# Patient Record
Sex: FEMALE | Race: WHITE | NOT HISPANIC OR LATINO | Employment: STUDENT | URBAN - METROPOLITAN AREA
[De-identification: names, ages, dates, MRNs, and addresses within clinical notes are randomized per-mention and may not be internally consistent; named-entity substitution may affect disease eponyms.]

---

## 2022-01-18 PROBLEM — F33.9 DEPRESSION, RECURRENT: Status: ACTIVE | Noted: 2022-01-18

## 2022-09-16 ENCOUNTER — OFFICE VISIT (OUTPATIENT)
Dept: FAMILY MEDICINE | Facility: CLINIC | Age: 22
End: 2022-09-16
Payer: COMMERCIAL

## 2022-09-16 VITALS
WEIGHT: 215.25 LBS | OXYGEN SATURATION: 99 % | DIASTOLIC BLOOD PRESSURE: 76 MMHG | RESPIRATION RATE: 18 BRPM | BODY MASS INDEX: 31.88 KG/M2 | HEART RATE: 88 BPM | HEIGHT: 69 IN | SYSTOLIC BLOOD PRESSURE: 110 MMHG

## 2022-09-16 DIAGNOSIS — F41.9 ANXIETY: ICD-10-CM

## 2022-09-16 DIAGNOSIS — R41.840 ATTENTION DEFICIT: Primary | ICD-10-CM

## 2022-09-16 DIAGNOSIS — F98.8 ATTENTION DEFICIT DISORDER, UNSPECIFIED HYPERACTIVITY PRESENCE: ICD-10-CM

## 2022-09-16 DIAGNOSIS — E66.9 OBESITY, UNSPECIFIED CLASSIFICATION, UNSPECIFIED OBESITY TYPE, UNSPECIFIED WHETHER SERIOUS COMORBIDITY PRESENT: ICD-10-CM

## 2022-09-16 PROCEDURE — 1160F PR REVIEW ALL MEDS BY PRESCRIBER/CLIN PHARMACIST DOCUMENTED: ICD-10-PCS | Mod: CPTII,S$GLB,, | Performed by: NURSE PRACTITIONER

## 2022-09-16 PROCEDURE — 3074F PR MOST RECENT SYSTOLIC BLOOD PRESSURE < 130 MM HG: ICD-10-PCS | Mod: CPTII,S$GLB,, | Performed by: NURSE PRACTITIONER

## 2022-09-16 PROCEDURE — 3078F PR MOST RECENT DIASTOLIC BLOOD PRESSURE < 80 MM HG: ICD-10-PCS | Mod: CPTII,S$GLB,, | Performed by: NURSE PRACTITIONER

## 2022-09-16 PROCEDURE — 3078F DIAST BP <80 MM HG: CPT | Mod: CPTII,S$GLB,, | Performed by: NURSE PRACTITIONER

## 2022-09-16 PROCEDURE — 3008F PR BODY MASS INDEX (BMI) DOCUMENTED: ICD-10-PCS | Mod: CPTII,S$GLB,, | Performed by: NURSE PRACTITIONER

## 2022-09-16 PROCEDURE — 3008F BODY MASS INDEX DOCD: CPT | Mod: CPTII,S$GLB,, | Performed by: NURSE PRACTITIONER

## 2022-09-16 PROCEDURE — 1159F PR MEDICATION LIST DOCUMENTED IN MEDICAL RECORD: ICD-10-PCS | Mod: CPTII,S$GLB,, | Performed by: NURSE PRACTITIONER

## 2022-09-16 PROCEDURE — 1159F MED LIST DOCD IN RCRD: CPT | Mod: CPTII,S$GLB,, | Performed by: NURSE PRACTITIONER

## 2022-09-16 PROCEDURE — 99204 PR OFFICE/OUTPT VISIT, NEW, LEVL IV, 45-59 MIN: ICD-10-PCS | Mod: S$GLB,,, | Performed by: NURSE PRACTITIONER

## 2022-09-16 PROCEDURE — 99204 OFFICE O/P NEW MOD 45 MIN: CPT | Mod: S$GLB,,, | Performed by: NURSE PRACTITIONER

## 2022-09-16 PROCEDURE — 3074F SYST BP LT 130 MM HG: CPT | Mod: CPTII,S$GLB,, | Performed by: NURSE PRACTITIONER

## 2022-09-16 PROCEDURE — 1160F RVW MEDS BY RX/DR IN RCRD: CPT | Mod: CPTII,S$GLB,, | Performed by: NURSE PRACTITIONER

## 2022-09-16 RX ORDER — PROPRANOLOL HYDROCHLORIDE 10 MG/1
10 TABLET ORAL 3 TIMES DAILY PRN
Qty: 90 TABLET | Refills: 11 | Status: SHIPPED | OUTPATIENT
Start: 2022-09-16 | End: 2023-09-16

## 2022-09-16 RX ORDER — SEMAGLUTIDE 1.34 MG/ML
0.5 INJECTION, SOLUTION SUBCUTANEOUS
Qty: 3 PEN | Refills: 3 | Status: SHIPPED | OUTPATIENT
Start: 2022-09-16 | End: 2023-01-12

## 2022-09-16 RX ORDER — LISDEXAMFETAMINE DIMESYLATE 40 MG/1
40 CAPSULE ORAL EVERY MORNING
Qty: 30 CAPSULE | Refills: 0 | Status: SHIPPED | OUTPATIENT
Start: 2022-11-16 | End: 2023-01-12 | Stop reason: SDUPTHER

## 2022-09-16 RX ORDER — LISDEXAMFETAMINE DIMESYLATE 40 MG/1
40 CAPSULE ORAL EVERY MORNING
Qty: 30 CAPSULE | Refills: 0 | Status: SHIPPED | OUTPATIENT
Start: 2022-10-16 | End: 2022-11-15

## 2022-09-16 RX ORDER — LISDEXAMFETAMINE DIMESYLATE 40 MG/1
40 CAPSULE ORAL EVERY MORNING
Qty: 30 CAPSULE | Refills: 0 | Status: SHIPPED | OUTPATIENT
Start: 2022-09-16 | End: 2022-10-16

## 2022-09-16 NOTE — PROGRESS NOTES
"Subjective:       Patient ID: Harmony Piedra is a 22 y.o. female.    Chief Complaint: Weight Gain  The pt presents today to discuss weight gain.     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  Frustrated, trying to watch what she eats, not exercising regularly but stays busy.    Pt has long hx of ADHD, stable on vyvanse 40 mg daily. Grades are well. Focus much better on medication.       HPI  Review of Systems   Constitutional:  Negative for appetite change, chills and fever.   HENT:  Negative for ear pain and postnasal drip.    Eyes:  Negative for pain and itching.   Respiratory:  Negative for chest tightness and shortness of breath.    Gastrointestinal:  Negative for abdominal distention and abdominal pain.   Endocrine: Negative for polydipsia and polyuria.   Genitourinary:  Negative for difficulty urinating and flank pain.   Skin:  Negative for color change and pallor.   Neurological:  Negative for light-headedness and headaches.   Hematological:  Negative for adenopathy. Does not bruise/bleed easily.   Psychiatric/Behavioral:  Negative for agitation.      Past medical, surgical, family and social history reviewed.  Objective:     Vitals:    09/16/22 0944   BP: 110/76   Pulse: 88   Resp: 18   SpO2: 99%   Weight: 97.7 kg (215 lb 4.5 oz)   Height: 5' 9" (1.753 m)   PainSc: 0-No pain     Body mass index is 31.79 kg/m².     Physical Exam  Constitutional:       General: She is not in acute distress.     Appearance: She is well-developed. She is obese. She is not diaphoretic.   HENT:      Head: Normocephalic and atraumatic.      Right Ear: Hearing, tympanic membrane, ear canal and external ear normal.      Left Ear: Hearing, tympanic membrane, ear canal and external ear normal.      Nose: Nose normal.      Mouth/Throat:      Pharynx: Uvula midline.   Eyes:      General:         Right eye: No discharge.         Left eye: No " discharge.      Conjunctiva/sclera: Conjunctivae normal.      Pupils: Pupils are equal, round, and reactive to light.   Neck:      Thyroid: No thyromegaly.      Vascular: No carotid bruit or JVD.      Trachea: Trachea normal.   Cardiovascular:      Rate and Rhythm: Normal rate and regular rhythm.      Heart sounds: No murmur heard.    No friction rub. No gallop.   Pulmonary:      Effort: Pulmonary effort is normal. No respiratory distress.      Breath sounds: Normal breath sounds. No wheezing or rales.   Chest:      Chest wall: No tenderness.   Abdominal:      General: Bowel sounds are normal. There is no distension.      Palpations: Abdomen is soft. There is no mass.      Tenderness: There is no abdominal tenderness. There is no guarding or rebound.   Musculoskeletal:         General: Normal range of motion.      Cervical back: Normal range of motion and neck supple.   Skin:     General: Skin is warm and dry.   Neurological:      Mental Status: She is alert and oriented to person, place, and time.      Coordination: Coordination normal.   Psychiatric:         Behavior: Behavior normal.         Thought Content: Thought content normal.         Judgment: Judgment normal.       Assessment:       1. Attention deficit    2. Attention deficit disorder, unspecified hyperactivity presence    3. Obesity, unspecified classification, unspecified obesity type, unspecified whether serious comorbidity present    4. Anxiety        Plan:       Harmony was seen today for weight gain.    Diagnoses and all orders for this visit:    Attention deficit    Attention deficit disorder, unspecified hyperactivity presence  -     lisdexamfetamine (VYVANSE) 40 MG Cap; Take 1 capsule (40 mg total) by mouth every morning.  -     lisdexamfetamine (VYVANSE) 40 MG Cap; Take 1 capsule (40 mg total) by mouth every morning.  -     lisdexamfetamine (VYVANSE) 40 MG Cap; Take 1 capsule (40 mg total) by mouth every morning.    Obesity, unspecified  classification, unspecified obesity type, unspecified whether serious comorbidity present    Anxiety  -     propranoloL (INDERAL) 10 MG tablet; Take 1 tablet (10 mg total) by mouth 3 (three) times daily as needed (anxiety).    Other orders  -     semaglutide (OZEMPIC) 0.25 mg or 0.5 mg(2 mg/1.5 mL) pen injector; Inject 0.5 mg into the skin every 7 days.    I spent 45 minutes on this encounter, time includes face-to-face, chart review, documentation, test review and orders.

## 2022-09-25 PROBLEM — F41.9 ANXIETY: Status: ACTIVE | Noted: 2022-09-25

## 2022-09-25 PROBLEM — F98.8 ATTENTION DEFICIT DISORDER: Status: ACTIVE | Noted: 2022-09-25

## 2022-09-25 PROBLEM — E66.9 OBESITY: Status: ACTIVE | Noted: 2022-09-25

## 2022-10-23 RX ORDER — SULFAMETHOXAZOLE AND TRIMETHOPRIM 800; 160 MG/1; MG/1
1 TABLET ORAL 2 TIMES DAILY
Qty: 6 TABLET | Refills: 0 | Status: SHIPPED | OUTPATIENT
Start: 2022-10-23 | End: 2022-10-26

## 2022-11-04 ENCOUNTER — PATIENT MESSAGE (OUTPATIENT)
Dept: FAMILY MEDICINE | Facility: CLINIC | Age: 22
End: 2022-11-04
Payer: COMMERCIAL

## 2022-11-04 DIAGNOSIS — N39.0 URINARY TRACT INFECTION WITHOUT HEMATURIA, SITE UNSPECIFIED: Primary | ICD-10-CM

## 2022-11-07 ENCOUNTER — LAB VISIT (OUTPATIENT)
Dept: LAB | Facility: HOSPITAL | Age: 22
End: 2022-11-07
Attending: NURSE PRACTITIONER
Payer: COMMERCIAL

## 2022-11-07 ENCOUNTER — PATIENT MESSAGE (OUTPATIENT)
Dept: FAMILY MEDICINE | Facility: CLINIC | Age: 22
End: 2022-11-07
Payer: COMMERCIAL

## 2022-11-07 DIAGNOSIS — N39.0 URINARY TRACT INFECTION WITHOUT HEMATURIA, SITE UNSPECIFIED: ICD-10-CM

## 2022-11-07 PROCEDURE — 87088 URINE BACTERIA CULTURE: CPT | Performed by: NURSE PRACTITIONER

## 2022-11-07 PROCEDURE — 87086 URINE CULTURE/COLONY COUNT: CPT | Performed by: NURSE PRACTITIONER

## 2022-11-07 PROCEDURE — 87186 SC STD MICRODIL/AGAR DIL: CPT | Performed by: NURSE PRACTITIONER

## 2022-11-07 PROCEDURE — 87077 CULTURE AEROBIC IDENTIFY: CPT | Performed by: NURSE PRACTITIONER

## 2022-11-09 ENCOUNTER — PATIENT MESSAGE (OUTPATIENT)
Dept: FAMILY MEDICINE | Facility: CLINIC | Age: 22
End: 2022-11-09
Payer: COMMERCIAL

## 2022-11-09 DIAGNOSIS — N39.0 COMPLICATED UTI (URINARY TRACT INFECTION): Primary | ICD-10-CM

## 2022-11-09 RX ORDER — CIPROFLOXACIN 500 MG/1
500 TABLET ORAL EVERY 12 HOURS
Qty: 14 TABLET | Refills: 0 | Status: SHIPPED | OUTPATIENT
Start: 2022-11-09 | End: 2022-11-14

## 2022-11-14 ENCOUNTER — TELEPHONE (OUTPATIENT)
Dept: FAMILY MEDICINE | Facility: CLINIC | Age: 22
End: 2022-11-14
Payer: COMMERCIAL

## 2022-11-14 ENCOUNTER — PATIENT MESSAGE (OUTPATIENT)
Dept: FAMILY MEDICINE | Facility: CLINIC | Age: 22
End: 2022-11-14
Payer: COMMERCIAL

## 2022-11-14 DIAGNOSIS — N39.0 URINARY TRACT INFECTION WITHOUT HEMATURIA, SITE UNSPECIFIED: Primary | ICD-10-CM

## 2022-11-14 RX ORDER — SULFAMETHOXAZOLE AND TRIMETHOPRIM 800; 160 MG/1; MG/1
1 TABLET ORAL 2 TIMES DAILY
Qty: 20 TABLET | Refills: 0 | Status: SHIPPED | OUTPATIENT
Start: 2022-11-14 | End: 2022-11-24

## 2022-11-16 DIAGNOSIS — F98.8 ATTENTION DEFICIT DISORDER, UNSPECIFIED HYPERACTIVITY PRESENCE: ICD-10-CM

## 2022-11-16 LAB — BACTERIA UR CULT: ABNORMAL

## 2022-11-16 RX ORDER — LISDEXAMFETAMINE DIMESYLATE 40 MG/1
40 CAPSULE ORAL EVERY MORNING
Qty: 30 CAPSULE | Refills: 0 | Status: CANCELLED | OUTPATIENT
Start: 2022-11-16 | End: 2022-12-16

## 2022-11-21 ENCOUNTER — OFFICE VISIT (OUTPATIENT)
Dept: UROLOGY | Facility: CLINIC | Age: 22
End: 2022-11-21
Payer: COMMERCIAL

## 2022-11-21 VITALS — WEIGHT: 200.63 LBS | HEIGHT: 66 IN | BODY MASS INDEX: 32.24 KG/M2

## 2022-11-21 DIAGNOSIS — N39.0 URINARY TRACT INFECTION WITHOUT HEMATURIA, SITE UNSPECIFIED: ICD-10-CM

## 2022-11-21 DIAGNOSIS — N30.00 ACUTE CYSTITIS WITHOUT HEMATURIA: Primary | ICD-10-CM

## 2022-11-21 PROCEDURE — 1159F PR MEDICATION LIST DOCUMENTED IN MEDICAL RECORD: ICD-10-PCS | Mod: CPTII,S$GLB,,

## 2022-11-21 PROCEDURE — 1159F MED LIST DOCD IN RCRD: CPT | Mod: CPTII,S$GLB,,

## 2022-11-21 PROCEDURE — 99999 PR PBB SHADOW E&M-EST. PATIENT-LVL III: ICD-10-PCS | Mod: PBBFAC,,,

## 2022-11-21 PROCEDURE — 3008F BODY MASS INDEX DOCD: CPT | Mod: CPTII,S$GLB,,

## 2022-11-21 PROCEDURE — 1160F RVW MEDS BY RX/DR IN RCRD: CPT | Mod: CPTII,S$GLB,,

## 2022-11-21 PROCEDURE — 99203 OFFICE O/P NEW LOW 30 MIN: CPT | Mod: S$GLB,,,

## 2022-11-21 PROCEDURE — 1160F PR REVIEW ALL MEDS BY PRESCRIBER/CLIN PHARMACIST DOCUMENTED: ICD-10-PCS | Mod: CPTII,S$GLB,,

## 2022-11-21 PROCEDURE — 99999 PR PBB SHADOW E&M-EST. PATIENT-LVL III: CPT | Mod: PBBFAC,,,

## 2022-11-21 PROCEDURE — 3008F PR BODY MASS INDEX (BMI) DOCUMENTED: ICD-10-PCS | Mod: CPTII,S$GLB,,

## 2022-11-21 PROCEDURE — 99203 PR OFFICE/OUTPT VISIT, NEW, LEVL III, 30-44 MIN: ICD-10-PCS | Mod: S$GLB,,,

## 2022-11-21 RX ORDER — METHENAMINE, SODIUM PHOSPHATE, MONOBASIC, MONOHYDRATE, PHENYL SALICYLATE, METHYLENE BLUE, AND HYOSCYAMINE SULFATE 118; 40.8; 36; 10; .12 MG/1; MG/1; MG/1; MG/1; MG/1
1 CAPSULE ORAL 4 TIMES DAILY PRN
Qty: 28 CAPSULE | Refills: 1 | Status: SHIPPED | OUTPATIENT
Start: 2022-11-21 | End: 2022-11-28

## 2022-11-21 NOTE — PROGRESS NOTES
[facility] Note  Staff: Eusebia Rosenberg FNP-C    PCP: REMEDIOS Guzman    Chief Complaint: UTI    Subjective:        HPI: Harmony Piedra is a 22 y.o. female NEW PATIENT presents today for evaluation of UTI. She states over the past few months she has taken multiple antibiotics for UTIs. She states her symptoms never truly resolve. She states in the past when she has a UTI, she takes antibiotics and all symptoms resolve without complications. She states she feels pain and pressure at her urethra and urgency. She is currently taking bactrim DS. Her only recent urine culture in Epic is from 11/7/22 positive for acinetobacter baumannii/haemolyticus 10,000-49,999. She denies fever, flank pain, hematuria, incontinence, back pain, and feeling of incomplete bladder emptying. She denies a history of kidney stones. She is currently not taking any bladder medications.     Questions asked pt during office visit today:  Urgency:Yes , incontinence with urgency? No;   DysuriaYes   Gross HematuriaNo  History of UTI: Yes     History of Kidney Stones?:  No    Constipation issues?:  No    REVIEW OF SYSTEMS:  Review of Systems   Constitutional: Negative.  Negative for chills and fever.   HENT: Negative.     Eyes: Negative.    Respiratory: Negative.     Cardiovascular: Negative.    Gastrointestinal: Negative.  Negative for abdominal pain, nausea and vomiting.   Genitourinary:  Positive for dysuria and urgency. Negative for flank pain, frequency and hematuria.   Musculoskeletal: Negative.  Negative for back pain.   Skin: Negative.    Neurological: Negative.    Endo/Heme/Allergies: Negative.    Psychiatric/Behavioral: Negative.       PMHx:  Past Medical History:   Diagnosis Date    Anxiety        PSHx:  Past Surgical History:   Procedure Laterality Date    TONSILLECTOMY         Fam Hx:   malignancies: No , gyn malignancies: No   kidney stones: No     Soc Hx:  Lives in [place]    Allergies:  Corticosteroids  (glucocorticoids), Dexamethasone, and Hydrocortisone    Medications: reviewed     Objective:   There were no vitals filed for this visit.    Physical Exam  Constitutional:       Appearance: Normal appearance.   HENT:      Head: Normocephalic.      Mouth/Throat:      Mouth: Mucous membranes are moist.   Eyes:      Conjunctiva/sclera: Conjunctivae normal.   Pulmonary:      Effort: Pulmonary effort is normal.   Abdominal:      General: There is no distension.      Palpations: Abdomen is soft.      Tenderness: There is no abdominal tenderness. There is no right CVA tenderness or left CVA tenderness.   Musculoskeletal:         General: Normal range of motion.      Cervical back: Normal range of motion.   Skin:     General: Skin is warm.   Neurological:      Mental Status: She is alert and oriented to person, place, and time.   Psychiatric:         Mood and Affect: Mood normal.         Behavior: Behavior normal.         LABS REVIEW:  UA today:   Color:Clear, Yellow  Spec. Grav.  1.020  PH  6.0  Negative for leukocytes, nitrates, protein, glucose, ketones, urobili, bili, and blood.    Assessment:       1. Acute cystitis without hematuria    2. Urinary tract infection without hematuria, site unspecified            Plan:      CT Urogram ordered and scheduled  uribel prescribed to pt today as trial to see if med improves pt's current LUTS.  Benefits, risks and side affects were thoroughly explained to pt today in office with all questions answered.  Urine culture ordered to be done 5 days after finishing antibiotics at [facility]  For your recurrent UTIs:  Behavioral changes to help decrease UTI recurrences   -increase water intake (6 to 8 bottles water per day)   -don't hold urine, void every 2 to 3 hours   -prevent constipation (miralax capful daily if necessary) to have a bowel movement daily and keep stools soft  -cut down on caffeine,drink mainly water  -no douching   -void immediately after sexual intercourse  -wipe  front to back     OTC meds to try (go to the pharmacist and ask where they are, they are over the counter)  -cranberry pills 500mg tabs TID, can buy over the counter  -take a probiotic daily designed for vaginal and urinary health  -D-Mannose once daily over the counter    IMPORTANT: If you feel like you have a UTI coming on, call our office and talk to one of the nurses. We will have you drop off a urine here in clinic or at one of our [facility] facilities. Avoid going to urgent care. We need to start documenting your urine cultures here in our office to see if they are really recurrent UTIs or sx of UTIs.     If you have fever and it doesn't improve with tylenol or ibuprofen or if you have flank pain associated with the uti symptoms go to the ER.     If you do continue to have positive urine cultures then we may proceed with doing a cystoscopy (to look in your bladder)    F/u As Needed    [facility]: Active    Eusebia Rosenberg, GUS

## 2022-11-22 ENCOUNTER — PATIENT MESSAGE (OUTPATIENT)
Dept: UROLOGY | Facility: CLINIC | Age: 22
End: 2022-11-22
Payer: COMMERCIAL

## 2022-11-23 ENCOUNTER — TELEPHONE (OUTPATIENT)
Dept: UROLOGY | Facility: CLINIC | Age: 22
End: 2022-11-23
Payer: COMMERCIAL

## 2022-11-29 ENCOUNTER — HOSPITAL ENCOUNTER (OUTPATIENT)
Dept: RADIOLOGY | Facility: HOSPITAL | Age: 22
Discharge: HOME | End: 2022-11-29
Payer: COMMERCIAL

## 2022-11-29 DIAGNOSIS — N30.00 ACUTE CYSTITIS WITHOUT HEMATURIA: ICD-10-CM

## 2022-11-29 PROCEDURE — 25500020 PHARM REV CODE 255: Mod: PO

## 2022-11-29 PROCEDURE — 74178 CT ABD&PLV WO CNTR FLWD CNTR: CPT | Mod: 26,,, | Performed by: RADIOLOGY

## 2022-11-29 PROCEDURE — 74178 CT ABD&PLV WO CNTR FLWD CNTR: CPT | Mod: TC,PO

## 2022-11-29 PROCEDURE — 74178 CT UROGRAM ABD PELVIS W WO: ICD-10-PCS | Mod: 26,,, | Performed by: RADIOLOGY

## 2022-11-29 RX ADMIN — IOHEXOL 125 ML: 350 INJECTION, SOLUTION INTRAVENOUS at 05:11

## 2022-11-30 ENCOUNTER — PATIENT MESSAGE (OUTPATIENT)
Dept: FAMILY MEDICINE | Facility: CLINIC | Age: 22
End: 2022-11-30
Payer: COMMERCIAL

## 2022-11-30 DIAGNOSIS — K76.9 LIVER DISEASE, UNSPECIFIED: Primary | ICD-10-CM

## 2022-12-01 ENCOUNTER — PATIENT MESSAGE (OUTPATIENT)
Dept: UROLOGY | Facility: CLINIC | Age: 22
End: 2022-12-01
Payer: COMMERCIAL

## 2022-12-11 ENCOUNTER — PATIENT MESSAGE (OUTPATIENT)
Dept: FAMILY MEDICINE | Facility: CLINIC | Age: 22
End: 2022-12-11
Payer: COMMERCIAL

## 2022-12-16 ENCOUNTER — PATIENT MESSAGE (OUTPATIENT)
Dept: UROLOGY | Facility: CLINIC | Age: 22
End: 2022-12-16
Payer: COMMERCIAL

## 2022-12-20 ENCOUNTER — CLINICAL SUPPORT (OUTPATIENT)
Dept: UROLOGY | Facility: CLINIC | Age: 22
End: 2022-12-20
Payer: COMMERCIAL

## 2022-12-20 DIAGNOSIS — N39.0 URINARY TRACT INFECTION WITHOUT HEMATURIA, SITE UNSPECIFIED: Primary | ICD-10-CM

## 2022-12-20 DIAGNOSIS — R31.29 MICROSCOPIC HEMATURIA: ICD-10-CM

## 2022-12-20 PROCEDURE — 87086 URINE CULTURE/COLONY COUNT: CPT

## 2022-12-20 PROCEDURE — 51701 PR INSERTION OF NON-INDWELLING BLADDER CATHETERIZATION FOR RESIDUAL UR: ICD-10-PCS | Mod: S$GLB,,,

## 2022-12-20 PROCEDURE — 99999 PR PBB SHADOW E&M-EST. PATIENT-LVL I: ICD-10-PCS | Mod: PBBFAC,,,

## 2022-12-20 PROCEDURE — 51701 INSERT BLADDER CATHETER: CPT | Mod: S$GLB,,,

## 2022-12-20 PROCEDURE — 99999 PR PBB SHADOW E&M-EST. PATIENT-LVL I: CPT | Mod: PBBFAC,,,

## 2022-12-20 NOTE — PROGRESS NOTES
Patient to clinic with reports of urinary frequency , per provider ok to get catheterized urine sample. Patient catheterized using sterile technique, aprox 60 ml clear yellow urine drained from patient bladder, patient tolerated well.

## 2022-12-21 LAB — BACTERIA UR CULT: NO GROWTH

## 2022-12-22 ENCOUNTER — HOSPITAL ENCOUNTER (OUTPATIENT)
Dept: RADIOLOGY | Facility: HOSPITAL | Age: 22
Discharge: HOME | End: 2022-12-22
Attending: NURSE PRACTITIONER
Payer: COMMERCIAL

## 2022-12-22 DIAGNOSIS — K76.9 LIVER DISEASE, UNSPECIFIED: ICD-10-CM

## 2022-12-22 PROCEDURE — 25500020 PHARM REV CODE 255: Performed by: NURSE PRACTITIONER

## 2022-12-22 PROCEDURE — 74183 MRI ABDOMEN W WO CONTRAST: ICD-10-PCS | Mod: 26,,, | Performed by: RADIOLOGY

## 2022-12-22 PROCEDURE — 74183 MRI ABD W/O CNTR FLWD CNTR: CPT | Mod: 26,,, | Performed by: RADIOLOGY

## 2022-12-22 PROCEDURE — A9585 GADOBUTROL INJECTION: HCPCS | Performed by: NURSE PRACTITIONER

## 2022-12-22 PROCEDURE — 74183 MRI ABD W/O CNTR FLWD CNTR: CPT | Mod: TC

## 2022-12-22 RX ORDER — GADOBUTROL 604.72 MG/ML
9 INJECTION INTRAVENOUS
Status: COMPLETED | OUTPATIENT
Start: 2022-12-22 | End: 2022-12-22

## 2022-12-22 RX ADMIN — GADOBUTROL 9 ML: 604.72 INJECTION INTRAVENOUS at 04:12

## 2022-12-23 ENCOUNTER — TELEPHONE (OUTPATIENT)
Dept: UROLOGY | Facility: CLINIC | Age: 22
End: 2022-12-23
Payer: COMMERCIAL

## 2022-12-23 NOTE — TELEPHONE ENCOUNTER
Lm on patient vm , patient recent urine culture showed no growth , poct urine did show trace blood, per patient provider will get patient scheduled for in office cystoscopy .

## 2023-01-12 ENCOUNTER — OFFICE VISIT (OUTPATIENT)
Dept: FAMILY MEDICINE | Facility: CLINIC | Age: 23
End: 2023-01-12
Payer: COMMERCIAL

## 2023-01-12 VITALS
WEIGHT: 210.56 LBS | SYSTOLIC BLOOD PRESSURE: 118 MMHG | OXYGEN SATURATION: 98 % | RESPIRATION RATE: 18 BRPM | HEART RATE: 77 BPM | BODY MASS INDEX: 33.84 KG/M2 | HEIGHT: 66 IN | DIASTOLIC BLOOD PRESSURE: 76 MMHG

## 2023-01-12 DIAGNOSIS — E66.9 OBESITY, UNSPECIFIED CLASSIFICATION, UNSPECIFIED OBESITY TYPE, UNSPECIFIED WHETHER SERIOUS COMORBIDITY PRESENT: ICD-10-CM

## 2023-01-12 DIAGNOSIS — F98.8 ATTENTION DEFICIT DISORDER, UNSPECIFIED HYPERACTIVITY PRESENCE: Primary | ICD-10-CM

## 2023-01-12 DIAGNOSIS — Q24.8 PERICARDIAL CYST: ICD-10-CM

## 2023-01-12 DIAGNOSIS — F33.9 DEPRESSION, RECURRENT: ICD-10-CM

## 2023-01-12 PROCEDURE — 99214 PR OFFICE/OUTPT VISIT, EST, LEVL IV, 30-39 MIN: ICD-10-PCS | Mod: S$GLB,,, | Performed by: NURSE PRACTITIONER

## 2023-01-12 PROCEDURE — 1159F MED LIST DOCD IN RCRD: CPT | Mod: CPTII,S$GLB,, | Performed by: NURSE PRACTITIONER

## 2023-01-12 PROCEDURE — 3074F PR MOST RECENT SYSTOLIC BLOOD PRESSURE < 130 MM HG: ICD-10-PCS | Mod: CPTII,S$GLB,, | Performed by: NURSE PRACTITIONER

## 2023-01-12 PROCEDURE — 1159F PR MEDICATION LIST DOCUMENTED IN MEDICAL RECORD: ICD-10-PCS | Mod: CPTII,S$GLB,, | Performed by: NURSE PRACTITIONER

## 2023-01-12 PROCEDURE — 3078F PR MOST RECENT DIASTOLIC BLOOD PRESSURE < 80 MM HG: ICD-10-PCS | Mod: CPTII,S$GLB,, | Performed by: NURSE PRACTITIONER

## 2023-01-12 PROCEDURE — 99214 OFFICE O/P EST MOD 30 MIN: CPT | Mod: S$GLB,,, | Performed by: NURSE PRACTITIONER

## 2023-01-12 PROCEDURE — 3074F SYST BP LT 130 MM HG: CPT | Mod: CPTII,S$GLB,, | Performed by: NURSE PRACTITIONER

## 2023-01-12 PROCEDURE — 1160F PR REVIEW ALL MEDS BY PRESCRIBER/CLIN PHARMACIST DOCUMENTED: ICD-10-PCS | Mod: CPTII,S$GLB,, | Performed by: NURSE PRACTITIONER

## 2023-01-12 PROCEDURE — 1160F RVW MEDS BY RX/DR IN RCRD: CPT | Mod: CPTII,S$GLB,, | Performed by: NURSE PRACTITIONER

## 2023-01-12 PROCEDURE — 3008F BODY MASS INDEX DOCD: CPT | Mod: CPTII,S$GLB,, | Performed by: NURSE PRACTITIONER

## 2023-01-12 PROCEDURE — 3078F DIAST BP <80 MM HG: CPT | Mod: CPTII,S$GLB,, | Performed by: NURSE PRACTITIONER

## 2023-01-12 PROCEDURE — 3008F PR BODY MASS INDEX (BMI) DOCUMENTED: ICD-10-PCS | Mod: CPTII,S$GLB,, | Performed by: NURSE PRACTITIONER

## 2023-01-12 RX ORDER — LISDEXAMFETAMINE DIMESYLATE 40 MG/1
40 CAPSULE ORAL EVERY MORNING
Qty: 30 CAPSULE | Refills: 0 | Status: SHIPPED | OUTPATIENT
Start: 2023-01-12 | End: 2023-02-09

## 2023-01-12 RX ORDER — LISDEXAMFETAMINE DIMESYLATE 40 MG/1
40 CAPSULE ORAL EVERY MORNING
Qty: 30 CAPSULE | Refills: 0 | Status: SHIPPED | OUTPATIENT
Start: 2023-02-12 | End: 2023-02-09

## 2023-01-12 RX ORDER — LISDEXAMFETAMINE DIMESYLATE 40 MG/1
40 CAPSULE ORAL EVERY MORNING
Qty: 30 CAPSULE | Refills: 0 | Status: SHIPPED | OUTPATIENT
Start: 2023-03-12 | End: 2023-03-26 | Stop reason: SDUPTHER

## 2023-01-12 RX ORDER — SEMAGLUTIDE 1.34 MG/ML
1 INJECTION, SOLUTION SUBCUTANEOUS
Qty: 3 PEN | Refills: 3 | Status: SHIPPED | OUTPATIENT
Start: 2023-01-12 | End: 2023-02-15

## 2023-01-12 NOTE — PROGRESS NOTES
"Subjective:       Patient ID: Harmony Piedra is a 22 y.o. female.    Chief Complaint: Follow-up  Alexei DIANAU   [employer]     Pt has long hx of ADHD, stable on vyvanse 40 mg daily. Grades are well. Focus much better on medication. would like refill on medication.     The patient has been on ozempic for weight loss.  She is currently on 0.5 mg.  She is only down approximately 5 lb since our last visit.  She is not experiencing any side effects to the medication.    Pericardial cyst incidentally found on imaging, pt is asymptomatic.      Follow-up  Pertinent negatives include no abdominal pain, arthralgias, congestion, headaches, myalgias, nausea, rash or vomiting.   Review of Systems   Constitutional:  Negative for activity change and appetite change.   HENT:  Negative for congestion, postnasal drip, rhinorrhea and sinus pressure.    Eyes:  Negative for pain and redness.   Respiratory:  Negative for choking and chest tightness.    Gastrointestinal:  Negative for abdominal distention, abdominal pain, blood in stool, constipation, diarrhea, nausea and vomiting.   Endocrine: Negative for polydipsia and polyphagia.   Genitourinary:  Negative for dysuria and hematuria.   Musculoskeletal:  Negative for arthralgias and myalgias.   Skin:  Negative for color change and rash.   Neurological:  Negative for dizziness and headaches.   Psychiatric/Behavioral:  Negative for agitation and behavioral problems.      Past medical, surgical, family and social history reviewed.  Objective:     Vitals:    01/12/23 1122   BP: 118/76   Pulse: 77   Resp: 18   SpO2: 98%   Weight: 95.5 kg (210 lb 8.6 oz)   Height: 5' 6" (1.676 m)   PainSc: 0-No pain     Body mass index is 33.98 kg/m².     Physical Exam  Constitutional:       Appearance: She is well-developed. She is obese.   HENT:      Head: Normocephalic and atraumatic.      Right Ear: External ear normal.      Left Ear: External ear normal.      Nose: Nose normal.   Eyes: "      General: No scleral icterus.        Right eye: No discharge.         Left eye: No discharge.      Conjunctiva/sclera: Conjunctivae normal.   Neck:      Trachea: No tracheal deviation.   Cardiovascular:      Rate and Rhythm: Normal rate and regular rhythm.      Heart sounds: Normal heart sounds. No murmur heard.    No friction rub.   Pulmonary:      Effort: Pulmonary effort is normal. No respiratory distress.      Breath sounds: Normal breath sounds. No stridor. No wheezing or rales.   Chest:      Chest wall: No tenderness.   Musculoskeletal:         General: Normal range of motion.      Cervical back: Normal range of motion and neck supple.   Lymphadenopathy:      Cervical: No cervical adenopathy.   Skin:     General: Skin is warm and dry.   Neurological:      Mental Status: She is alert and oriented to person, place, and time.       Assessment:       1. Attention deficit disorder, unspecified hyperactivity presence    2. Pericardial cyst    3. Depression, recurrent    4. Obesity, unspecified classification, unspecified obesity type, unspecified whether serious comorbidity present          Plan:       Harmony was seen today for follow-up.    Diagnoses and all orders for this visit:    Attention deficit disorder, unspecified hyperactivity presence  -     lisdexamfetamine (VYVANSE) 40 MG Cap; Take 1 capsule (40 mg total) by mouth every morning.  -     lisdexamfetamine (VYVANSE) 40 MG Cap; Take 1 capsule (40 mg total) by mouth every morning.  -     lisdexamfetamine (VYVANSE) 40 MG Cap; Take 1 capsule (40 mg total) by mouth every morning.    Pericardial cyst  -     Ambulatory referral/consult to Cardiology; Future    Depression, recurrent  Stable off meds    Obesity, unspecified classification, unspecified obesity type, unspecified whether serious comorbidity present    -     semaglutide (OZEMPIC) 1 mg/dose (4 mg/3 mL); Inject 1 mg into the skin every 7 days.    I spent 30 minutes on this encounter, time includes  face-to-face, chart review, documentation, test review and orders.

## 2023-01-28 ENCOUNTER — PATIENT MESSAGE (OUTPATIENT)
Dept: UROLOGY | Facility: CLINIC | Age: 23
End: 2023-01-28
Payer: COMMERCIAL

## 2023-01-28 ENCOUNTER — PATIENT MESSAGE (OUTPATIENT)
Dept: FAMILY MEDICINE | Facility: CLINIC | Age: 23
End: 2023-01-28
Payer: COMMERCIAL

## 2023-01-31 ENCOUNTER — CLINICAL SUPPORT (OUTPATIENT)
Dept: UROLOGY | Facility: CLINIC | Age: 23
End: 2023-01-31
Payer: COMMERCIAL

## 2023-01-31 DIAGNOSIS — N39.0 URINARY TRACT INFECTION WITHOUT HEMATURIA, SITE UNSPECIFIED: Primary | ICD-10-CM

## 2023-01-31 PROCEDURE — 51701 PR INSERTION OF NON-INDWELLING BLADDER CATHETERIZATION FOR RESIDUAL UR: ICD-10-PCS | Mod: S$GLB,,,

## 2023-01-31 PROCEDURE — 99999 PR PBB SHADOW E&M-EST. PATIENT-LVL I: ICD-10-PCS | Mod: PBBFAC,,,

## 2023-01-31 PROCEDURE — 51701 INSERT BLADDER CATHETER: CPT | Mod: S$GLB,,,

## 2023-01-31 PROCEDURE — 99999 PR PBB SHADOW E&M-EST. PATIENT-LVL I: CPT | Mod: PBBFAC,,,

## 2023-01-31 PROCEDURE — 87086 URINE CULTURE/COLONY COUNT: CPT

## 2023-01-31 NOTE — PROGRESS NOTES
Patient to clinic with c/o dysuria , patient catheterized using sterile technique , aprox 400 ml clear yellow urine drained from bladder, will send for culture. Patient tolerated well.

## 2023-02-02 LAB — BACTERIA UR CULT: NO GROWTH

## 2023-02-08 ENCOUNTER — PATIENT MESSAGE (OUTPATIENT)
Dept: FAMILY MEDICINE | Facility: CLINIC | Age: 23
End: 2023-02-08
Payer: COMMERCIAL

## 2023-02-09 ENCOUNTER — PATIENT MESSAGE (OUTPATIENT)
Dept: FAMILY MEDICINE | Facility: CLINIC | Age: 23
End: 2023-02-09
Payer: COMMERCIAL

## 2023-02-09 ENCOUNTER — OFFICE VISIT (OUTPATIENT)
Dept: CARDIOLOGY | Facility: CLINIC | Age: 23
End: 2023-02-09
Payer: COMMERCIAL

## 2023-02-09 VITALS
WEIGHT: 209 LBS | BODY MASS INDEX: 30.96 KG/M2 | HEIGHT: 69 IN | DIASTOLIC BLOOD PRESSURE: 81 MMHG | HEART RATE: 84 BPM | SYSTOLIC BLOOD PRESSURE: 122 MMHG

## 2023-02-09 DIAGNOSIS — Q24.8 PERICARDIAL CYST: ICD-10-CM

## 2023-02-09 PROCEDURE — 3079F PR MOST RECENT DIASTOLIC BLOOD PRESSURE 80-89 MM HG: ICD-10-PCS | Mod: CPTII,S$GLB,, | Performed by: INTERNAL MEDICINE

## 2023-02-09 PROCEDURE — 3008F BODY MASS INDEX DOCD: CPT | Mod: CPTII,S$GLB,, | Performed by: INTERNAL MEDICINE

## 2023-02-09 PROCEDURE — 1159F MED LIST DOCD IN RCRD: CPT | Mod: CPTII,S$GLB,, | Performed by: INTERNAL MEDICINE

## 2023-02-09 PROCEDURE — 3008F PR BODY MASS INDEX (BMI) DOCUMENTED: ICD-10-PCS | Mod: CPTII,S$GLB,, | Performed by: INTERNAL MEDICINE

## 2023-02-09 PROCEDURE — 99204 PR OFFICE/OUTPT VISIT, NEW, LEVL IV, 45-59 MIN: ICD-10-PCS | Mod: S$GLB,,, | Performed by: INTERNAL MEDICINE

## 2023-02-09 PROCEDURE — 3079F DIAST BP 80-89 MM HG: CPT | Mod: CPTII,S$GLB,, | Performed by: INTERNAL MEDICINE

## 2023-02-09 PROCEDURE — 93010 ELECTROCARDIOGRAM REPORT: CPT | Mod: S$GLB,,, | Performed by: INTERNAL MEDICINE

## 2023-02-09 PROCEDURE — 3074F SYST BP LT 130 MM HG: CPT | Mod: CPTII,S$GLB,, | Performed by: INTERNAL MEDICINE

## 2023-02-09 PROCEDURE — 3074F PR MOST RECENT SYSTOLIC BLOOD PRESSURE < 130 MM HG: ICD-10-PCS | Mod: CPTII,S$GLB,, | Performed by: INTERNAL MEDICINE

## 2023-02-09 PROCEDURE — 93005 ELECTROCARDIOGRAM TRACING: CPT | Mod: PO

## 2023-02-09 PROCEDURE — 99204 OFFICE O/P NEW MOD 45 MIN: CPT | Mod: S$GLB,,, | Performed by: INTERNAL MEDICINE

## 2023-02-09 PROCEDURE — 99999 PR PBB SHADOW E&M-EST. PATIENT-LVL IV: ICD-10-PCS | Mod: PBBFAC,,, | Performed by: INTERNAL MEDICINE

## 2023-02-09 PROCEDURE — 93010 EKG 12-LEAD: ICD-10-PCS | Mod: S$GLB,,, | Performed by: INTERNAL MEDICINE

## 2023-02-09 PROCEDURE — 1159F PR MEDICATION LIST DOCUMENTED IN MEDICAL RECORD: ICD-10-PCS | Mod: CPTII,S$GLB,, | Performed by: INTERNAL MEDICINE

## 2023-02-09 PROCEDURE — 99999 PR PBB SHADOW E&M-EST. PATIENT-LVL IV: CPT | Mod: PBBFAC,,, | Performed by: INTERNAL MEDICINE
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
## 2023-02-15 ENCOUNTER — PATIENT MESSAGE (OUTPATIENT)
Dept: FAMILY MEDICINE | Facility: CLINIC | Age: 23
End: 2023-02-15
Payer: COMMERCIAL

## 2023-02-15 RX ORDER — SEMAGLUTIDE 2.68 MG/ML
2 INJECTION, SOLUTION SUBCUTANEOUS
Qty: 3 PEN | Refills: 3 | Status: SHIPPED | OUTPATIENT
Start: 2023-02-15 | End: 2023-05-08 | Stop reason: SDUPTHER

## 2023-02-20 ENCOUNTER — HOSPITAL ENCOUNTER (OUTPATIENT)
Dept: RADIOLOGY | Facility: HOSPITAL | Age: 23
Discharge: HOME | End: 2023-02-20
Attending: INTERNAL MEDICINE
Payer: COMMERCIAL

## 2023-02-20 DIAGNOSIS — Q24.8 PERICARDIAL CYST: ICD-10-CM

## 2023-02-20 PROCEDURE — 71260 CT THORAX DX C+: CPT | Mod: 26,,, | Performed by: RADIOLOGY

## 2023-02-20 PROCEDURE — 25500020 PHARM REV CODE 255: Mod: PO | Performed by: INTERNAL MEDICINE

## 2023-02-20 PROCEDURE — 71260 CT THORAX DX C+: CPT | Mod: TC,PO

## 2023-02-20 PROCEDURE — 71260 CT CHEST WITH CONTRAST: ICD-10-PCS | Mod: 26,,, | Performed by: RADIOLOGY

## 2023-02-20 RX ADMIN — IOHEXOL 75 ML: 350 INJECTION, SOLUTION INTRAVENOUS at 04:02

## 2023-03-01 ENCOUNTER — CLINICAL SUPPORT (OUTPATIENT)
Dept: CARDIOLOGY | Facility: HOSPITAL | Age: 23
End: 2023-03-01
Attending: INTERNAL MEDICINE
Payer: COMMERCIAL

## 2023-03-01 VITALS
DIASTOLIC BLOOD PRESSURE: 80 MMHG | WEIGHT: 208 LBS | HEIGHT: 69 IN | SYSTOLIC BLOOD PRESSURE: 122 MMHG | BODY MASS INDEX: 30.81 KG/M2

## 2023-03-01 DIAGNOSIS — Q24.8 PERICARDIAL CYST: ICD-10-CM

## 2023-03-01 PROCEDURE — 93306 ECHO (CUPID ONLY): ICD-10-PCS | Mod: 26,,, | Performed by: INTERNAL MEDICINE

## 2023-03-01 PROCEDURE — 93306 TTE W/DOPPLER COMPLETE: CPT | Mod: 26,,, | Performed by: INTERNAL MEDICINE

## 2023-03-01 PROCEDURE — 93306 TTE W/DOPPLER COMPLETE: CPT | Mod: PO

## 2023-03-02 LAB
ASCENDING AORTA: 2.15 CM
AV INDEX (PROSTH): 0.77
AV MEAN GRADIENT: 4 MMHG
AV PEAK GRADIENT: 6 MMHG
AV VALVE AREA: 2.7 CM2
AV VELOCITY RATIO: 0.76
BSA FOR ECHO PROCEDURE: 2.14 M2
CV ECHO LV RWT: 0.35 CM
DOP CALC AO PEAK VEL: 1.23 M/S
DOP CALC AO VTI: 24.3 CM
DOP CALC LVOT AREA: 3.5 CM2
DOP CALC LVOT DIAMETER: 2.11 CM
DOP CALC LVOT PEAK VEL: 0.94 M/S
DOP CALC LVOT STROKE VOLUME: 65.7 CM3
DOP CALCLVOT PEAK VEL VTI: 18.8 CM
E WAVE DECELERATION TIME: 162.44 MSEC
E/A RATIO: 1.38
E/E' RATIO: 5.53 M/S
ECHO LV POSTERIOR WALL: 0.87 CM (ref 0.6–1.1)
EJECTION FRACTION: 55 %
FRACTIONAL SHORTENING: 33 % (ref 28–44)
INTERVENTRICULAR SEPTUM: 0.8 CM (ref 0.6–1.1)
IVRT: 79.92 MSEC
LA MAJOR: 5.11 CM
LA MINOR: 4.8 CM
LA WIDTH: 3.4 CM
LEFT ATRIUM SIZE: 3.81 CM
LEFT ATRIUM VOLUME INDEX: 26 ML/M2
LEFT ATRIUM VOLUME: 54.51 CM3
LEFT INTERNAL DIMENSION IN SYSTOLE: 3.31 CM (ref 2.1–4)
LEFT VENTRICLE DIASTOLIC VOLUME INDEX: 54.57 ML/M2
LEFT VENTRICLE DIASTOLIC VOLUME: 114.6 ML
LEFT VENTRICLE MASS INDEX: 67 G/M2
LEFT VENTRICLE SYSTOLIC VOLUME INDEX: 21.1 ML/M2
LEFT VENTRICLE SYSTOLIC VOLUME: 44.36 ML
LEFT VENTRICULAR INTERNAL DIMENSION IN DIASTOLE: 4.93 CM (ref 3.5–6)
LEFT VENTRICULAR MASS: 140.1 G
LV LATERAL E/E' RATIO: 4.37 M/S
LV SEPTAL E/E' RATIO: 7.55 M/S
LVOT MG: 1.93 MMHG
LVOT MV: 0.66 CM/S
MV PEAK A VEL: 0.6 M/S
MV PEAK E VEL: 0.83 M/S
MV STENOSIS PRESSURE HALF TIME: 47.11 MS
MV VALVE AREA P 1/2 METHOD: 4.67 CM2
PISA TR MAX VEL: 2.15 M/S
PULM VEIN S/D RATIO: 0.78
PV PEAK D VEL: 0.59 M/S
PV PEAK S VEL: 0.46 M/S
RA MAJOR: 4.26 CM
RA PRESSURE: 3 MMHG
RIGHT VENTRICULAR END-DIASTOLIC DIMENSION: 3.17 CM
RIGHT VENTRICULAR LENGTH IN DIASTOLE (APICAL 4-CHAMBER VIEW): 6.74 CM
RV MID DIAMA: 19.56 CM
RV TISSUE DOPPLER FREE WALL SYSTOLIC VELOCITY 1 (APICAL 4 CHAMBER VIEW): 0.02 CM/S
SINUS: 2.43 CM
STJ: 2.26 CM
TDI LATERAL: 0.19 M/S
TDI SEPTAL: 0.11 M/S
TDI: 0.15 M/S
TR MAX PG: 18 MMHG
TRICUSPID ANNULAR PLANE SYSTOLIC EXCURSION: 1.98 CM
TV REST PULMONARY ARTERY PRESSURE: 21 MMHG

## 2023-03-06 DIAGNOSIS — Q24.8 PERICARDIAL CYST: Primary | ICD-10-CM

## 2023-03-09 ENCOUNTER — TELEPHONE (OUTPATIENT)
Dept: CARDIOLOGY | Facility: CLINIC | Age: 23
End: 2023-03-09
Payer: COMMERCIAL

## 2023-03-09 NOTE — TELEPHONE ENCOUNTER
----- Message from Kendra Valencia sent at 3/9/2023 12:29 PM CST -----  Contact: [phone]  Type: Needs Medical Advice  Who Called:  Pt     Best Call Back Number: [phone]    Additional Information: Pt requesting a call back about a phone call she received from [facility]. Pt is not sure why she was referred by dr Alvarez.

## 2023-04-13 ENCOUNTER — OFFICE VISIT (OUTPATIENT)
Dept: FAMILY MEDICINE | Facility: CLINIC | Age: 23
End: 2023-04-13
Payer: COMMERCIAL

## 2023-04-13 VITALS
HEART RATE: 102 BPM | RESPIRATION RATE: 20 BRPM | HEIGHT: 69 IN | WEIGHT: 203.06 LBS | DIASTOLIC BLOOD PRESSURE: 80 MMHG | SYSTOLIC BLOOD PRESSURE: 120 MMHG | OXYGEN SATURATION: 98 % | TEMPERATURE: 98 F | BODY MASS INDEX: 30.08 KG/M2

## 2023-04-13 DIAGNOSIS — F98.8 ATTENTION DEFICIT DISORDER, UNSPECIFIED HYPERACTIVITY PRESENCE: ICD-10-CM

## 2023-04-13 PROCEDURE — 1159F MED LIST DOCD IN RCRD: CPT | Mod: CPTII,S$GLB,, | Performed by: NURSE PRACTITIONER

## 2023-04-13 PROCEDURE — 3008F BODY MASS INDEX DOCD: CPT | Mod: CPTII,S$GLB,, | Performed by: NURSE PRACTITIONER

## 2023-04-13 PROCEDURE — 1160F PR REVIEW ALL MEDS BY PRESCRIBER/CLIN PHARMACIST DOCUMENTED: ICD-10-PCS | Mod: CPTII,S$GLB,, | Performed by: NURSE PRACTITIONER

## 2023-04-13 PROCEDURE — 99214 PR OFFICE/OUTPT VISIT, EST, LEVL IV, 30-39 MIN: ICD-10-PCS | Mod: S$GLB,,, | Performed by: NURSE PRACTITIONER

## 2023-04-13 PROCEDURE — 3079F DIAST BP 80-89 MM HG: CPT | Mod: CPTII,S$GLB,, | Performed by: NURSE PRACTITIONER

## 2023-04-13 PROCEDURE — 3008F PR BODY MASS INDEX (BMI) DOCUMENTED: ICD-10-PCS | Mod: CPTII,S$GLB,, | Performed by: NURSE PRACTITIONER

## 2023-04-13 PROCEDURE — 3074F PR MOST RECENT SYSTOLIC BLOOD PRESSURE < 130 MM HG: ICD-10-PCS | Mod: CPTII,S$GLB,, | Performed by: NURSE PRACTITIONER

## 2023-04-13 PROCEDURE — 3079F PR MOST RECENT DIASTOLIC BLOOD PRESSURE 80-89 MM HG: ICD-10-PCS | Mod: CPTII,S$GLB,, | Performed by: NURSE PRACTITIONER

## 2023-04-13 PROCEDURE — 3074F SYST BP LT 130 MM HG: CPT | Mod: CPTII,S$GLB,, | Performed by: NURSE PRACTITIONER

## 2023-04-13 PROCEDURE — 1160F RVW MEDS BY RX/DR IN RCRD: CPT | Mod: CPTII,S$GLB,, | Performed by: NURSE PRACTITIONER

## 2023-04-13 PROCEDURE — 99214 OFFICE O/P EST MOD 30 MIN: CPT | Mod: S$GLB,,, | Performed by: NURSE PRACTITIONER

## 2023-04-13 PROCEDURE — 1159F PR MEDICATION LIST DOCUMENTED IN MEDICAL RECORD: ICD-10-PCS | Mod: CPTII,S$GLB,, | Performed by: NURSE PRACTITIONER

## 2023-04-13 RX ORDER — LISDEXAMFETAMINE DIMESYLATE 40 MG/1
40 CAPSULE ORAL EVERY MORNING
Qty: 30 CAPSULE | Refills: 0 | Status: SHIPPED | OUTPATIENT
Start: 2023-06-13 | End: 2023-09-20

## 2023-04-13 RX ORDER — LISDEXAMFETAMINE DIMESYLATE 40 MG/1
40 CAPSULE ORAL EVERY MORNING
Qty: 30 CAPSULE | Refills: 0 | Status: SHIPPED | OUTPATIENT
Start: 2023-04-13 | End: 2023-05-13

## 2023-04-13 RX ORDER — LISDEXAMFETAMINE DIMESYLATE 40 MG/1
40 CAPSULE ORAL EVERY MORNING
Qty: 30 CAPSULE | Refills: 0 | Status: SHIPPED | OUTPATIENT
Start: 2023-05-13 | End: 2023-06-12

## 2023-04-13 NOTE — PROGRESS NOTES
"Subjective:       Patient ID: Harmony Piedra is a 22 y.o. female.    Chief Complaint: Follow-up  The pt is here for a f/u visit.   Junior VALENTIN   [employer]      Pt has long hx of ADHD, stable on vyvanse 40 mg daily. Grades are well. Focus much better on medication. would like refill on medication.     HPI  Review of Systems   Constitutional:  Negative for appetite change, chills and fever.   HENT:  Negative for ear pain and postnasal drip.    Eyes:  Negative for pain and itching.   Respiratory:  Negative for chest tightness and shortness of breath.    Gastrointestinal:  Negative for abdominal distention and abdominal pain.   Endocrine: Negative for polydipsia and polyuria.   Genitourinary:  Negative for difficulty urinating and flank pain.   Skin:  Negative for color change and pallor.   Neurological:  Negative for light-headedness and headaches.   Hematological:  Negative for adenopathy. Does not bruise/bleed easily.   Psychiatric/Behavioral:  Negative for agitation.      Past medical, surgical, family and social history reviewed.  Objective:     Vitals:    04/13/23 1045   BP: 120/80   Pulse: 102   Resp: 20   Temp: 98.2 °F (36.8 °C)   SpO2: 98%   Weight: 92.1 kg (203 lb 0.7 oz)   Height: 5' 9" (1.753 m)   PainSc: 0-No pain     Body mass index is 29.98 kg/m².     Physical Exam  Constitutional:       Appearance: She is well-developed.   HENT:      Head: Normocephalic and atraumatic.      Right Ear: External ear normal.      Left Ear: External ear normal.      Nose: Nose normal.   Eyes:      General: No scleral icterus.        Right eye: No discharge.         Left eye: No discharge.      Conjunctiva/sclera: Conjunctivae normal.   Neck:      Trachea: No tracheal deviation.   Cardiovascular:      Rate and Rhythm: Normal rate and regular rhythm.      Heart sounds: Normal heart sounds. No murmur heard.    No friction rub.   Pulmonary:      Effort: Pulmonary effort is normal. No respiratory distress.    "   Breath sounds: Normal breath sounds. No stridor. No wheezing or rales.   Chest:      Chest wall: No tenderness.   Musculoskeletal:         General: Normal range of motion.      Cervical back: Normal range of motion and neck supple.   Lymphadenopathy:      Cervical: No cervical adenopathy.   Skin:     General: Skin is warm and dry.   Neurological:      Mental Status: She is alert and oriented to person, place, and time.       Assessment:       1. Attention deficit disorder, unspecified hyperactivity presence        Plan:       Harmony was seen today for follow-up.    Diagnoses and all orders for this visit:    Attention deficit disorder, unspecified hyperactivity presence  -     lisdexamfetamine (VYVANSE) 40 MG Cap; Take 1 capsule (40 mg total) by mouth every morning.  -     lisdexamfetamine (VYVANSE) 40 MG Cap; Take 1 capsule (40 mg total) by mouth every morning.  -     lisdexamfetamine (VYVANSE) 40 MG Cap; Take 1 capsule (40 mg total) by mouth every morning.    I spent 30 minutes on this encounter, time includes face-to-face, chart review, documentation, test review and orders.

## 2023-04-14 ENCOUNTER — TELEPHONE (OUTPATIENT)
Dept: FAMILY MEDICINE | Facility: CLINIC | Age: 23
End: 2023-04-14
Payer: COMMERCIAL

## 2023-04-14 NOTE — TELEPHONE ENCOUNTER
Phoned patient to notify her that prior authorization for Vyvanse 40 mg was approved but no answer. Left  on recorder for patient to call back.

## 2023-05-03 ENCOUNTER — TELEPHONE (OUTPATIENT)
Dept: FAMILY MEDICINE | Facility: CLINIC | Age: 23
End: 2023-05-03
Payer: COMMERCIAL

## 2023-05-03 NOTE — TELEPHONE ENCOUNTER
PA needed for Ozempic 2mg.   Initiated via [email or web address]  Key:  [id number]      Awaiting response.

## 2023-05-08 ENCOUNTER — PATIENT MESSAGE (OUTPATIENT)
Dept: FAMILY MEDICINE | Facility: CLINIC | Age: 23
End: 2023-05-08
Payer: COMMERCIAL

## 2023-05-08 RX ORDER — SEMAGLUTIDE 2.68 MG/ML
2 INJECTION, SOLUTION SUBCUTANEOUS
Qty: 3 EACH | Refills: 3 | Status: SHIPPED | OUTPATIENT
Start: 2023-05-08 | End: 2023-09-20

## 2023-05-09 ENCOUNTER — TELEPHONE (OUTPATIENT)
Dept: FAMILY MEDICINE | Facility: CLINIC | Age: 23
End: 2023-05-09
Payer: COMMERCIAL

## 2023-05-09 DIAGNOSIS — Z00.00 LABORATORY EXAM ORDERED AS PART OF ROUTINE GENERAL MEDICAL EXAMINATION: Primary | ICD-10-CM

## 2023-05-19 ENCOUNTER — PATIENT MESSAGE (OUTPATIENT)
Dept: FAMILY MEDICINE | Facility: CLINIC | Age: 23
End: 2023-05-19
Payer: COMMERCIAL

## 2023-05-21 ENCOUNTER — PATIENT MESSAGE (OUTPATIENT)
Dept: FAMILY MEDICINE | Facility: CLINIC | Age: 23
End: 2023-05-21
Payer: COMMERCIAL

## 2023-05-24 ENCOUNTER — LAB VISIT (OUTPATIENT)
Dept: LAB | Facility: HOSPITAL | Age: 23
End: 2023-05-24
Attending: NURSE PRACTITIONER
Payer: COMMERCIAL

## 2023-05-24 DIAGNOSIS — F41.9 ANXIETY: ICD-10-CM

## 2023-05-24 DIAGNOSIS — F33.9 DEPRESSION, RECURRENT: ICD-10-CM

## 2023-05-24 DIAGNOSIS — E66.9 OBESITY, UNSPECIFIED CLASSIFICATION, UNSPECIFIED OBESITY TYPE, UNSPECIFIED WHETHER SERIOUS COMORBIDITY PRESENT: ICD-10-CM

## 2023-05-24 DIAGNOSIS — F41.1 GENERALIZED ANXIETY DISORDER: Primary | ICD-10-CM

## 2023-05-24 LAB
ALBUMIN SERPL-MCNC: 4.2 G/DL (ref 3.5–5)
ALBUMIN/GLOB SERPL: 1.4 RATIO (ref 1.1–2)
ALP SERPL-CCNC: 52 UNIT/L (ref 40–150)
ALT SERPL-CCNC: 14 UNIT/L (ref 0–55)
AST SERPL-CCNC: 19 UNIT/L (ref 5–34)
BASOPHILS # BLD AUTO: 0.05 X10(3)/MCL
BASOPHILS NFR BLD AUTO: 0.7 %
BILIRUBIN DIRECT+TOT PNL SERPL-MCNC: 0.5 MG/DL
BUN SERPL-MCNC: 6.6 MG/DL (ref 7–18.7)
CALCIUM SERPL-MCNC: 9.5 MG/DL (ref 8.4–10.2)
CHLORIDE SERPL-SCNC: 108 MMOL/L (ref 98–107)
CHOLEST SERPL-MCNC: 171 MG/DL
CHOLEST/HDLC SERPL: 4 {RATIO} (ref 0–5)
CO2 SERPL-SCNC: 25 MMOL/L (ref 22–29)
CREAT SERPL-MCNC: 0.71 MG/DL (ref 0.55–1.02)
EOSINOPHIL # BLD AUTO: 0.09 X10(3)/MCL (ref 0–0.9)
EOSINOPHIL NFR BLD AUTO: 1.2 %
ERYTHROCYTE [DISTWIDTH] IN BLOOD BY AUTOMATED COUNT: 12.5 % (ref 11.5–17)
EST. AVERAGE GLUCOSE BLD GHB EST-MCNC: 91.1 MG/DL
GFR SERPLBLD CREATININE-BSD FMLA CKD-EPI: >60 MLS/MIN/1.73/M2
GLOBULIN SER-MCNC: 3.1 GM/DL (ref 2.4–3.5)
GLUCOSE SERPL-MCNC: 109 MG/DL (ref 74–100)
HBA1C MFR BLD: 4.8 %
HCT VFR BLD AUTO: 37.5 % (ref 37–47)
HDLC SERPL-MCNC: 39 MG/DL (ref 35–60)
HGB BLD-MCNC: 12.4 G/DL (ref 12–16)
IMM GRANULOCYTES # BLD AUTO: 0.01 X10(3)/MCL (ref 0–0.04)
IMM GRANULOCYTES NFR BLD AUTO: 0.1 %
LDLC SERPL CALC-MCNC: 117 MG/DL (ref 50–140)
LYMPHOCYTES # BLD AUTO: 1.82 X10(3)/MCL (ref 0.6–4.6)
LYMPHOCYTES NFR BLD AUTO: 24.5 %
MCH RBC QN AUTO: 28.2 PG (ref 27–31)
MCHC RBC AUTO-ENTMCNC: 33.1 G/DL (ref 33–36)
MCV RBC AUTO: 85.2 FL (ref 80–94)
MONOCYTES # BLD AUTO: 0.63 X10(3)/MCL (ref 0.1–1.3)
MONOCYTES NFR BLD AUTO: 8.5 %
NEUTROPHILS # BLD AUTO: 4.82 X10(3)/MCL (ref 2.1–9.2)
NEUTROPHILS NFR BLD AUTO: 65 %
NRBC BLD AUTO-RTO: 0 %
PLATELET # BLD AUTO: 302 X10(3)/MCL (ref 130–400)
PMV BLD AUTO: 9.6 FL (ref 7.4–10.4)
POTASSIUM SERPL-SCNC: 4.1 MMOL/L (ref 3.5–5.1)
PROT SERPL-MCNC: 7.3 GM/DL (ref 6.4–8.3)
RBC # BLD AUTO: 4.4 X10(6)/MCL (ref 4.2–5.4)
SODIUM SERPL-SCNC: 141 MMOL/L (ref 136–145)
TRIGL SERPL-MCNC: 73 MG/DL (ref 37–140)
TSH SERPL-ACNC: 0.69 UIU/ML (ref 0.35–4.94)
VLDLC SERPL CALC-MCNC: 15 MG/DL
WBC # SPEC AUTO: 7.42 X10(3)/MCL (ref 4.5–11.5)

## 2023-05-24 PROCEDURE — 84443 ASSAY THYROID STIM HORMONE: CPT

## 2023-05-24 PROCEDURE — 80061 LIPID PANEL: CPT

## 2023-05-24 PROCEDURE — 83036 HEMOGLOBIN GLYCOSYLATED A1C: CPT

## 2023-05-24 PROCEDURE — 80053 COMPREHEN METABOLIC PANEL: CPT

## 2023-05-24 PROCEDURE — 36415 COLL VENOUS BLD VENIPUNCTURE: CPT

## 2023-05-24 PROCEDURE — 85025 COMPLETE CBC W/AUTO DIFF WBC: CPT

## 2023-08-30 DIAGNOSIS — F98.8 ATTENTION DEFICIT DISORDER, UNSPECIFIED HYPERACTIVITY PRESENCE: ICD-10-CM

## 2023-08-31 RX ORDER — LISDEXAMFETAMINE DIMESYLATE 40 MG/1
40 CAPSULE ORAL EVERY MORNING
Qty: 30 CAPSULE | Refills: 0 | OUTPATIENT
Start: 2023-08-31 | End: 2023-09-30

## 2023-09-08 ENCOUNTER — PATIENT MESSAGE (OUTPATIENT)
Dept: FAMILY MEDICINE | Facility: CLINIC | Age: 23
End: 2023-09-08
Payer: COMMERCIAL

## 2023-09-08 DIAGNOSIS — F98.8 ATTENTION DEFICIT DISORDER, UNSPECIFIED HYPERACTIVITY PRESENCE: ICD-10-CM

## 2023-09-08 RX ORDER — LISDEXAMFETAMINE DIMESYLATE 40 MG/1
40 CAPSULE ORAL EVERY MORNING
Qty: 30 CAPSULE | Refills: 0 | OUTPATIENT
Start: 2023-09-08 | End: 2023-10-08

## 2023-09-14 NOTE — TELEPHONE ENCOUNTER
----- Message from Kerrie Fox sent at 9/14/2023 12:25 PM CDT -----  Contact: Patient  Type:  Sooner Appointment Request    Caller is requesting a sooner appointment.  Caller declined first available appointment listed below.  Caller will not accept being placed on the waitlist and is requesting a message be sent to doctor.    Name of Caller:  Self  When is the first available appointment?  10/03/2023  Symptoms:  needs sooner adhd rx f/up appt  Best Call Back Number:  [phone]  Additional Information:  Please call the patient back at the phone number listed above to advise. Thank you!

## 2023-09-20 ENCOUNTER — TELEPHONE (OUTPATIENT)
Dept: FAMILY MEDICINE | Facility: CLINIC | Age: 23
End: 2023-09-20

## 2023-09-20 ENCOUNTER — OFFICE VISIT (OUTPATIENT)
Dept: FAMILY MEDICINE | Facility: CLINIC | Age: 23
End: 2023-09-20
Payer: COMMERCIAL

## 2023-09-20 DIAGNOSIS — F98.8 ATTENTION DEFICIT DISORDER, UNSPECIFIED HYPERACTIVITY PRESENCE: Primary | ICD-10-CM

## 2023-09-20 PROCEDURE — 1159F PR MEDICATION LIST DOCUMENTED IN MEDICAL RECORD: ICD-10-PCS | Mod: CPTII,95,, | Performed by: NURSE PRACTITIONER

## 2023-09-20 PROCEDURE — 3044F PR MOST RECENT HEMOGLOBIN A1C LEVEL <7.0%: ICD-10-PCS | Mod: CPTII,95,, | Performed by: NURSE PRACTITIONER

## 2023-09-20 PROCEDURE — 3044F HG A1C LEVEL LT 7.0%: CPT | Mod: CPTII,95,, | Performed by: NURSE PRACTITIONER

## 2023-09-20 PROCEDURE — 99214 PR OFFICE/OUTPT VISIT, EST, LEVL IV, 30-39 MIN: ICD-10-PCS | Mod: 95,,, | Performed by: NURSE PRACTITIONER

## 2023-09-20 PROCEDURE — 99214 OFFICE O/P EST MOD 30 MIN: CPT | Mod: 95,,, | Performed by: NURSE PRACTITIONER

## 2023-09-20 PROCEDURE — 1160F RVW MEDS BY RX/DR IN RCRD: CPT | Mod: CPTII,95,, | Performed by: NURSE PRACTITIONER

## 2023-09-20 PROCEDURE — 1159F MED LIST DOCD IN RCRD: CPT | Mod: CPTII,95,, | Performed by: NURSE PRACTITIONER

## 2023-09-20 PROCEDURE — 1160F PR REVIEW ALL MEDS BY PRESCRIBER/CLIN PHARMACIST DOCUMENTED: ICD-10-PCS | Mod: CPTII,95,, | Performed by: NURSE PRACTITIONER

## 2023-09-20 RX ORDER — LISDEXAMFETAMINE DIMESYLATE 40 MG/1
40 CAPSULE ORAL DAILY
Qty: 30 CAPSULE | Refills: 0 | Status: SHIPPED | OUTPATIENT
Start: 2023-11-20 | End: 2023-12-21 | Stop reason: SDUPTHER

## 2023-09-20 RX ORDER — LISDEXAMFETAMINE DIMESYLATE 40 MG/1
40 CAPSULE ORAL DAILY
Qty: 30 CAPSULE | Refills: 0 | Status: SHIPPED | OUTPATIENT
Start: 2023-10-20 | End: 2023-12-21 | Stop reason: SDUPTHER

## 2023-09-20 RX ORDER — LISDEXAMFETAMINE DIMESYLATE 40 MG/1
40 CAPSULE ORAL DAILY
Qty: 30 CAPSULE | Refills: 0 | Status: SHIPPED | OUTPATIENT
Start: 2023-09-20 | End: 2023-12-21 | Stop reason: SDUPTHER

## 2023-09-20 NOTE — PROGRESS NOTES
The patient location is: [place]  The chief complaint leading to consultation is: f/u  Visit type: audiovisual  Total time spent with patient: 15 mins  Each patient to whom he or she provides medical services by telemedicine is:  (1) informed of the relationship between the physician and patient and the respective role of any other health care provider with respect to management of the patient; and (2) notified that he or she may decline to receive medical services by telemedicine and may withdraw from such care at any time.    HPI:  The pt is here for a f/u visit.   [employer]      Pt has long hx of ADHD, stable on vyvanse 40 mg daily. Struggling because she has not been on her meds, she ran out. Grades are well. Focus much better on medication. would like refill on medication.          Review of Systems:  Psychiatric/Behavioral: Negative for agitation, behavioral problems, confusion, decreased concentration, dysphoric mood, hallucinations, self-injury, sleep disturbance and suicidal ideas. The patient is not nervous/anxious and is not hyperactive.        Physical Exam  Constitutional:       General: Patient is not in acute distress.     Appearance: Normal appearance. Patient is not ill-appearing, toxic-appearing or diaphoretic.   HENT:      Head: Normocephalic and atraumatic.      Nose: Nose normal.   Eyes:      General: No scleral icterus.     Conjunctiva/sclera: Conjunctivae normal.   Neck:      Musculoskeletal: Neck supple.   Pulmonary:      Effort: No respiratory distress.   Neurological:      Mental Status: The patient is alert.   Psychiatric:         Attention and Perception: Attention and perception normal.         Mood and Affect: Mood normal.         Speech: Speech normal.         Behavior: Behavior normal.         Thought Content: Thought content normal.         Cognition and Memory: Cognition normal.         Judgment: Judgment normal.     Assessment:       1. Attention deficit  disorder, unspecified hyperactivity presence        Plan:       Attention deficit disorder, unspecified hyperactivity presence    Other orders  -     lisdexamfetamine (VYVANSE) 40 MG Cap; Take 1 capsule (40 mg total) by mouth once daily.  Dispense: 30 capsule; Refill: 0  -     lisdexamfetamine (VYVANSE) 40 MG Cap; Take 1 capsule (40 mg total) by mouth once daily.  Dispense: 30 capsule; Refill: 0  -     lisdexamfetamine (VYVANSE) 40 MG Cap; Take 1 capsule (40 mg total) by mouth once daily.  Dispense: 30 capsule; Refill: 0    I spent 30 minutes on this encounter, time includes face-to-face, chart review, documentation, test review and orders.         Answers submitted by the patient for this visit:  Review of Systems Questionnaire (Submitted on 9/20/2023)  activity change: No  unexpected weight change: No  neck pain: No  hearing loss: No  rhinorrhea: No  trouble swallowing: No  eye discharge: No  visual disturbance: No  chest tightness: No  wheezing: No  chest pain: No  palpitations: No  blood in stool: No  constipation: No  vomiting: No  diarrhea: No  polydipsia: No  polyuria: No  difficulty urinating: No  hematuria: No  menstrual problem: Yes  dysuria: No  joint swelling: No  arthralgias: No  headaches: No  weakness: No  confusion: No  dysphoric mood: No

## 2023-09-27 ENCOUNTER — PATIENT MESSAGE (OUTPATIENT)
Dept: FAMILY MEDICINE | Facility: CLINIC | Age: 23
End: 2023-09-27
Payer: COMMERCIAL

## 2023-09-28 ENCOUNTER — OFFICE VISIT (OUTPATIENT)
Dept: FAMILY MEDICINE | Facility: CLINIC | Age: 23
End: 2023-09-28
Payer: COMMERCIAL

## 2023-09-28 VITALS
DIASTOLIC BLOOD PRESSURE: 70 MMHG | OXYGEN SATURATION: 99 % | TEMPERATURE: 98 F | BODY MASS INDEX: 30.97 KG/M2 | HEIGHT: 69 IN | SYSTOLIC BLOOD PRESSURE: 120 MMHG | WEIGHT: 209.13 LBS | HEART RATE: 80 BPM

## 2023-09-28 DIAGNOSIS — F07.81 POST CONCUSSIVE SYNDROME: Primary | ICD-10-CM

## 2023-09-28 PROCEDURE — 3044F PR MOST RECENT HEMOGLOBIN A1C LEVEL <7.0%: ICD-10-PCS | Mod: CPTII,S$GLB,, | Performed by: NURSE PRACTITIONER

## 2023-09-28 PROCEDURE — 3074F SYST BP LT 130 MM HG: CPT | Mod: CPTII,S$GLB,, | Performed by: NURSE PRACTITIONER

## 2023-09-28 PROCEDURE — 3078F DIAST BP <80 MM HG: CPT | Mod: CPTII,S$GLB,, | Performed by: NURSE PRACTITIONER

## 2023-09-28 PROCEDURE — 3044F HG A1C LEVEL LT 7.0%: CPT | Mod: CPTII,S$GLB,, | Performed by: NURSE PRACTITIONER

## 2023-09-28 PROCEDURE — 3008F PR BODY MASS INDEX (BMI) DOCUMENTED: ICD-10-PCS | Mod: CPTII,S$GLB,, | Performed by: NURSE PRACTITIONER

## 2023-09-28 PROCEDURE — 99214 PR OFFICE/OUTPT VISIT, EST, LEVL IV, 30-39 MIN: ICD-10-PCS | Mod: S$GLB,,, | Performed by: NURSE PRACTITIONER

## 2023-09-28 PROCEDURE — 3008F BODY MASS INDEX DOCD: CPT | Mod: CPTII,S$GLB,, | Performed by: NURSE PRACTITIONER

## 2023-09-28 PROCEDURE — 3074F PR MOST RECENT SYSTOLIC BLOOD PRESSURE < 130 MM HG: ICD-10-PCS | Mod: CPTII,S$GLB,, | Performed by: NURSE PRACTITIONER

## 2023-09-28 PROCEDURE — 99214 OFFICE O/P EST MOD 30 MIN: CPT | Mod: S$GLB,,, | Performed by: NURSE PRACTITIONER

## 2023-09-28 PROCEDURE — 3078F PR MOST RECENT DIASTOLIC BLOOD PRESSURE < 80 MM HG: ICD-10-PCS | Mod: CPTII,S$GLB,, | Performed by: NURSE PRACTITIONER

## 2023-09-28 PROCEDURE — 1159F PR MEDICATION LIST DOCUMENTED IN MEDICAL RECORD: ICD-10-PCS | Mod: CPTII,S$GLB,, | Performed by: NURSE PRACTITIONER

## 2023-09-28 PROCEDURE — 1159F MED LIST DOCD IN RCRD: CPT | Mod: CPTII,S$GLB,, | Performed by: NURSE PRACTITIONER

## 2023-09-28 RX ORDER — MEDROXYPROGESTERONE ACETATE 10 MG/1
10 TABLET ORAL
COMMUNITY
Start: 2023-07-04

## 2023-09-28 RX ORDER — PROPRANOLOL HYDROCHLORIDE 10 MG/1
TABLET ORAL
COMMUNITY

## 2023-09-28 NOTE — PROGRESS NOTES
"Subjective:       Patient ID: Harmony Piedra is a 23 y.o. female.    Chief Complaint: ER F/U   Harmony Piedra is a 23 y.o. female who presents for evaluation of a possible concussion. Initial evaluation was performed in the Emergency Department. Injury occurred 6 days ago while on the sideline at a football game.  She is a .  A fall was kicked and she was hit in the face with it. Mechanism of injury was ball to head contact. The point of impact was the face. Patient did not experience an altered level of consciousness. Patient did not have retrograde and anterograde amnesia. Since the injury, her symptoms include balance or coordination problems, difficulty concentrating, difficulty sleeping, dizziness, feeling "out of it", headache, nausea. Headaches are improved with OTC     Tylenol or ibuprofen. She has had no previous head injuries.     The patient was seen in the emergency room on 09/25/2023 with worsening of symptoms.  CT of the brain was normal.    HPI  Review of Systems   Constitutional:  Negative for appetite change, chills and fever.   HENT:  Negative for ear pain and postnasal drip.    Eyes:  Negative for pain and itching.   Respiratory:  Negative for chest tightness and shortness of breath.    Gastrointestinal:  Negative for abdominal distention and abdominal pain.   Endocrine: Negative for polydipsia and polyuria.   Genitourinary:  Negative for difficulty urinating and flank pain.   Skin:  Negative for color change and pallor.   Neurological:  Positive for headaches. Negative for light-headedness.   Hematological:  Negative for adenopathy. Does not bruise/bleed easily.   Psychiatric/Behavioral:  Positive for decreased concentration and sleep disturbance. Negative for agitation.        Past medical, surgical, family and social history reviewed.  Objective:     Vitals:    09/28/23 0953   BP: 120/70   Pulse: 80   Temp: 98.1 °F (36.7 °C)   SpO2: 99%   Weight: 94.8 kg (209 " "lb 1.7 oz)   Height: 5' 9" (1.753 m)   PainSc: 0-No pain     Body mass index is 30.88 kg/m².     Physical Exam  Constitutional:       Appearance: She is well-developed.   HENT:      Head: Normocephalic and atraumatic.      Right Ear: External ear normal.      Left Ear: External ear normal.      Nose: Nose normal.   Eyes:      General: No scleral icterus.        Right eye: No discharge.         Left eye: No discharge.      Extraocular Movements:      Right eye: Normal extraocular motion.      Left eye: Normal extraocular motion.      Conjunctiva/sclera: Conjunctivae normal.   Neck:      Trachea: No tracheal deviation.   Cardiovascular:      Rate and Rhythm: Normal rate and regular rhythm.      Heart sounds: Normal heart sounds. No murmur heard.     No friction rub.   Pulmonary:      Effort: Pulmonary effort is normal. No respiratory distress.      Breath sounds: Normal breath sounds. No stridor. No wheezing or rales.   Chest:      Chest wall: No tenderness.   Musculoskeletal:         General: Normal range of motion.      Cervical back: Normal range of motion and neck supple.   Lymphadenopathy:      Cervical: No cervical adenopathy.   Skin:     General: Skin is warm and dry.   Neurological:      Mental Status: She is alert and oriented to person, place, and time.      Cranial Nerves: Cranial nerves 2-12 are intact.      Sensory: Sensation is intact.      Motor: Motor function is intact.      Coordination: Coordination is intact.         Assessment:       1. Post concussive syndrome        Plan:       Harmony was seen today for er f/u.    Diagnoses and all orders for this visit:    Post concussive syndrome      See patient instructions for post concussive syndrome  These were reviewed with the pt in the clinic     I spent 30 minutes on this encounter, time includes face-to-face, chart review, documentation, test review and orders.     "

## 2023-12-21 ENCOUNTER — OFFICE VISIT (OUTPATIENT)
Dept: FAMILY MEDICINE | Facility: CLINIC | Age: 23
End: 2023-12-21
Payer: COMMERCIAL

## 2023-12-21 DIAGNOSIS — F98.8 ATTENTION DEFICIT DISORDER, UNSPECIFIED HYPERACTIVITY PRESENCE: Primary | ICD-10-CM

## 2023-12-21 PROCEDURE — 99214 PR OFFICE/OUTPT VISIT, EST, LEVL IV, 30-39 MIN: ICD-10-PCS | Mod: 95,,, | Performed by: NURSE PRACTITIONER

## 2023-12-21 PROCEDURE — 3044F PR MOST RECENT HEMOGLOBIN A1C LEVEL <7.0%: ICD-10-PCS | Mod: CPTII,95,, | Performed by: NURSE PRACTITIONER

## 2023-12-21 PROCEDURE — 3044F HG A1C LEVEL LT 7.0%: CPT | Mod: CPTII,95,, | Performed by: NURSE PRACTITIONER

## 2023-12-21 PROCEDURE — 99214 OFFICE O/P EST MOD 30 MIN: CPT | Mod: 95,,, | Performed by: NURSE PRACTITIONER

## 2023-12-21 PROCEDURE — 1160F PR REVIEW ALL MEDS BY PRESCRIBER/CLIN PHARMACIST DOCUMENTED: ICD-10-PCS | Mod: CPTII,95,, | Performed by: NURSE PRACTITIONER

## 2023-12-21 PROCEDURE — 1159F PR MEDICATION LIST DOCUMENTED IN MEDICAL RECORD: ICD-10-PCS | Mod: CPTII,95,, | Performed by: NURSE PRACTITIONER

## 2023-12-21 PROCEDURE — 1159F MED LIST DOCD IN RCRD: CPT | Mod: CPTII,95,, | Performed by: NURSE PRACTITIONER

## 2023-12-21 PROCEDURE — 1160F RVW MEDS BY RX/DR IN RCRD: CPT | Mod: CPTII,95,, | Performed by: NURSE PRACTITIONER

## 2023-12-21 RX ORDER — LISDEXAMFETAMINE DIMESYLATE 40 MG/1
40 CAPSULE ORAL DAILY
Qty: 30 CAPSULE | Refills: 0 | Status: SHIPPED | OUTPATIENT
Start: 2024-02-21 | End: 2024-03-28 | Stop reason: SDUPTHER

## 2023-12-21 RX ORDER — LISDEXAMFETAMINE DIMESYLATE 40 MG/1
40 CAPSULE ORAL DAILY
Qty: 30 CAPSULE | Refills: 0 | Status: SHIPPED | OUTPATIENT
Start: 2023-12-21 | End: 2024-03-28 | Stop reason: SDUPTHER

## 2023-12-21 RX ORDER — LISDEXAMFETAMINE DIMESYLATE 40 MG/1
40 CAPSULE ORAL DAILY
Qty: 30 CAPSULE | Refills: 0 | Status: SHIPPED | OUTPATIENT
Start: 2024-01-21 | End: 2024-03-28 | Stop reason: SDUPTHER

## 2023-12-21 NOTE — PROGRESS NOTES
The patient location is: [place]  The chief complaint leading to consultation is: f/u  Visit type: audiovisual  Total time spent with patient: 15 mins  Each patient to whom he or she provides medical services by telemedicine is:  (1) informed of the relationship between the physician and patient and the respective role of any other health care provider with respect to management of the patient; and (2) notified that he or she may decline to receive medical services by telemedicine and may withdraw from such care at any time.    HPI:  The pt is here for a f/u visit.   Senior [employer], graduating next semester.  [employer]      Pt has long hx of ADHD, stable on vyvanse 40 mg daily.  Doing much better since she has resumed her medication.  Grades are well. Focus much better on medication. would like refill on medication.       Review of Systems:  Psychiatric/Behavioral: Negative for agitation, behavioral problems, confusion, decreased concentration, dysphoric mood, hallucinations, self-injury, sleep disturbance and suicidal ideas. The patient is not nervous/anxious and is not hyperactive.        Physical Exam  Constitutional:       General: Patient is not in acute distress.     Appearance: Normal appearance. Patient is not ill-appearing, toxic-appearing or diaphoretic.   HENT:      Head: Normocephalic and atraumatic.      Nose: Nose normal.   Eyes:      General: No scleral icterus.     Conjunctiva/sclera: Conjunctivae normal.   Neck:      Musculoskeletal: Neck supple.   Pulmonary:      Effort: No respiratory distress.   Neurological:      Mental Status: The patient is alert.   Psychiatric:         Attention and Perception: Attention and perception normal.         Mood and Affect: Mood normal.         Speech: Speech normal.         Behavior: Behavior normal.         Thought Content: Thought content normal.         Cognition and Memory: Cognition normal.         Judgment: Judgment normal.     Assessment:       1.  Attention deficit disorder, unspecified hyperactivity presence        Plan:       Attention deficit disorder, unspecified hyperactivity presence  -     lisdexamfetamine (VYVANSE) 40 MG Cap; Take 1 capsule (40 mg total) by mouth once daily.  Dispense: 30 capsule; Refill: 0  -     lisdexamfetamine (VYVANSE) 40 MG Cap; Take 1 capsule (40 mg total) by mouth once daily.  Dispense: 30 capsule; Refill: 0  -     lisdexamfetamine (VYVANSE) 40 MG Cap; Take 1 capsule (40 mg total) by mouth once daily.  Dispense: 30 capsule; Refill: 0          I spent 30 minutes on this encounter, time includes face-to-face, chart review, documentation, test review and orders.           Answers submitted by the patient for this visit:  Review of Systems Questionnaire (Submitted on 12/21/2023)  activity change: No  unexpected weight change: No  neck pain: No  hearing loss: No  rhinorrhea: No  trouble swallowing: No  eye discharge: No  visual disturbance: No  chest tightness: No  wheezing: No  chest pain: No  palpitations: No  blood in stool: No  constipation: No  vomiting: No  diarrhea: No  polydipsia: No  polyuria: No  difficulty urinating: No  hematuria: No  menstrual problem: No  dysuria: No  joint swelling: No  arthralgias: No  headaches: No  weakness: No  confusion: No  dysphoric mood: No

## 2023-12-27 ENCOUNTER — TELEPHONE (OUTPATIENT)
Dept: FAMILY MEDICINE | Facility: CLINIC | Age: 23
End: 2023-12-27
Payer: COMMERCIAL

## 2024-03-28 ENCOUNTER — TELEPHONE (OUTPATIENT)
Dept: FAMILY MEDICINE | Facility: CLINIC | Age: 24
End: 2024-03-28

## 2024-03-28 ENCOUNTER — OFFICE VISIT (OUTPATIENT)
Dept: FAMILY MEDICINE | Facility: CLINIC | Age: 24
End: 2024-03-28
Payer: COMMERCIAL

## 2024-03-28 DIAGNOSIS — F98.8 ATTENTION DEFICIT DISORDER, UNSPECIFIED HYPERACTIVITY PRESENCE: ICD-10-CM

## 2024-03-28 DIAGNOSIS — F33.9 DEPRESSION, RECURRENT: ICD-10-CM

## 2024-03-28 PROCEDURE — 99214 OFFICE O/P EST MOD 30 MIN: CPT | Mod: 95,,, | Performed by: NURSE PRACTITIONER

## 2024-03-28 RX ORDER — LISDEXAMFETAMINE DIMESYLATE 40 MG/1
40 CAPSULE ORAL DAILY
Qty: 30 CAPSULE | Refills: 0 | Status: SHIPPED | OUTPATIENT
Start: 2024-03-28 | End: 2024-04-08 | Stop reason: SDUPTHER

## 2024-03-28 RX ORDER — LISDEXAMFETAMINE DIMESYLATE 40 MG/1
40 CAPSULE ORAL DAILY
Qty: 30 CAPSULE | Refills: 0 | Status: SHIPPED | OUTPATIENT
Start: 2024-04-28 | End: 2024-04-08 | Stop reason: SDUPTHER

## 2024-03-28 RX ORDER — LISDEXAMFETAMINE DIMESYLATE 40 MG/1
40 CAPSULE ORAL DAILY
Qty: 30 CAPSULE | Refills: 0 | Status: SHIPPED | OUTPATIENT
Start: 2024-05-28

## 2024-03-28 NOTE — TELEPHONE ENCOUNTER
----- Message from Leilani Guzman NP sent at 3/28/2024  2:31 PM CDT -----  Please book three-month follow-up virtual visit

## 2024-03-28 NOTE — PROGRESS NOTES
The patient location is: [place]  The chief complaint leading to consultation is: f/u  Visit type: audiovisual  Total time spent with patient: 15 mins  Each patient to whom he or she provides medical services by telemedicine is:  (1) informed of the relationship between the physician and patient and the respective role of any other health care provider with respect to management of the patient; and (2) notified that he or she may decline to receive medical services by telemedicine and may withdraw from such care at any time.    HPI:   pt is here for a f/u visit.   Senior [employer], graduating soon  [employer]      Pt has long hx of ADHD, stable on vyvanse 40 mg daily.  Doing much better since she has resumed her medication.  Grades are well. Focus much better on medication. would like refill on medication.       Review of Systems:  Psychiatric/Behavioral: Negative for agitation, behavioral problems, confusion, decreased concentration, dysphoric mood, hallucinations, self-injury, sleep disturbance and suicidal ideas. The patient is not nervous/anxious and is not hyperactive.        Physical Exam  Constitutional:       General: Patient is not in acute distress.     Appearance: Normal appearance. Patient is not ill-appearing, toxic-appearing or diaphoretic.   HENT:      Head: Normocephalic and atraumatic.      Nose: Nose normal.   Eyes:      General: No scleral icterus.     Conjunctiva/sclera: Conjunctivae normal.   Neck:      Musculoskeletal: Neck supple.   Pulmonary:      Effort: No respiratory distress.   Neurological:      Mental Status: The patient is alert.   Psychiatric:         Attention and Perception: Attention and perception normal.         Mood and Affect: Mood normal.         Speech: Speech normal.         Behavior: Behavior normal.         Thought Content: Thought content normal.         Cognition and Memory: Cognition normal.         Judgment: Judgment normal.     Assessment:       1. Attention  deficit disorder, unspecified hyperactivity presence    2. Depression, recurrent        Plan:       Attention deficit disorder, unspecified hyperactivity presence  -     lisdexamfetamine (VYVANSE) 40 MG Cap; Take 1 capsule (40 mg total) by mouth once daily.  Dispense: 30 capsule; Refill: 0  -     lisdexamfetamine (VYVANSE) 40 MG Cap; Take 1 capsule (40 mg total) by mouth once daily.  Dispense: 30 capsule; Refill: 0  -     lisdexamfetamine (VYVANSE) 40 MG Cap; Take 1 capsule (40 mg total) by mouth once daily.  Dispense: 30 capsule; Refill: 0    Depression, recurrent    Moods are stable without any medications.  Patient is happy with the way she feels.    I spent 30 minutes on this encounter, time includes face-to-face, chart review, documentation, test review and orders.         Answers submitted by the patient for this visit:  Review of Systems Questionnaire (Submitted on 3/28/2024)  activity change: No  unexpected weight change: No  neck pain: No  hearing loss: No  rhinorrhea: No  trouble swallowing: No  eye discharge: No  visual disturbance: No  chest tightness: No  wheezing: No  chest pain: No  palpitations: No  blood in stool: No  constipation: No  vomiting: No  diarrhea: No  polydipsia: No  polyuria: No  difficulty urinating: No  hematuria: No  menstrual problem: Yes  dysuria: No  joint swelling: No  arthralgias: No  headaches: No  weakness: No  confusion: No  dysphoric mood: No

## 2024-05-30 RX ORDER — PROPRANOLOL HYDROCHLORIDE 10 MG/1
TABLET ORAL
Qty: 270 TABLET | Refills: 3 | Status: SHIPPED | OUTPATIENT
Start: 2024-05-30

## 2024-05-31 ENCOUNTER — PATIENT MESSAGE (OUTPATIENT)
Dept: FAMILY MEDICINE | Facility: CLINIC | Age: 24
End: 2024-05-31

## 2024-05-31 DIAGNOSIS — Z23 IMMUNIZATION DUE: Primary | ICD-10-CM

## 2024-06-04 ENCOUNTER — CLINICAL SUPPORT (OUTPATIENT)
Dept: FAMILY MEDICINE | Facility: CLINIC | Age: 24
End: 2024-06-04
Payer: COMMERCIAL

## 2024-06-04 DIAGNOSIS — Z11.1 SCREENING EXAMINATION FOR PULMONARY TUBERCULOSIS: Primary | ICD-10-CM

## 2024-06-04 PROCEDURE — 90715 TDAP VACCINE 7 YRS/> IM: CPT | Mod: S$GLB,,, | Performed by: NURSE PRACTITIONER

## 2024-06-04 PROCEDURE — 90471 IMMUNIZATION ADMIN: CPT | Mod: S$GLB,,, | Performed by: NURSE PRACTITIONER

## 2024-06-04 PROCEDURE — 86580 TB INTRADERMAL TEST: CPT | Mod: S$GLB,,, | Performed by: NURSE PRACTITIONER

## 2024-06-06 ENCOUNTER — CLINICAL SUPPORT (OUTPATIENT)
Dept: FAMILY MEDICINE | Facility: CLINIC | Age: 24
End: 2024-06-06
Payer: COMMERCIAL

## 2024-06-06 DIAGNOSIS — Z11.1 SCREENING EXAMINATION FOR PULMONARY TUBERCULOSIS: Primary | ICD-10-CM

## 2024-06-06 LAB
TB INDURATION - 48 HR READ: 0 MM
TB INDURATION - 72 HR READ: NORMAL
TB SKIN TEST - 48 HR READ: NEGATIVE
TB SKIN TEST - 72 HR READ: NORMAL

## 2024-06-06 PROCEDURE — 86580 TB INTRADERMAL TEST: CPT | Mod: S$GLB,,, | Performed by: NURSE PRACTITIONER

## 2024-12-09 ENCOUNTER — PATIENT MESSAGE (OUTPATIENT)
Dept: FAMILY MEDICINE | Facility: CLINIC | Age: 24
End: 2024-12-09
Payer: COMMERCIAL

## 2024-12-19 ENCOUNTER — OFFICE VISIT (OUTPATIENT)
Dept: FAMILY MEDICINE | Facility: CLINIC | Age: 24
End: 2024-12-19
Payer: COMMERCIAL

## 2024-12-19 VITALS
OXYGEN SATURATION: 96 % | HEIGHT: 69 IN | RESPIRATION RATE: 18 BRPM | SYSTOLIC BLOOD PRESSURE: 110 MMHG | WEIGHT: 188.5 LBS | HEART RATE: 69 BPM | TEMPERATURE: 98 F | BODY MASS INDEX: 27.92 KG/M2 | DIASTOLIC BLOOD PRESSURE: 88 MMHG

## 2024-12-19 DIAGNOSIS — F98.8 ATTENTION DEFICIT DISORDER, UNSPECIFIED TYPE: ICD-10-CM

## 2024-12-19 PROCEDURE — G2211 COMPLEX E/M VISIT ADD ON: HCPCS | Mod: S$GLB,,, | Performed by: NURSE PRACTITIONER

## 2024-12-19 PROCEDURE — 3079F DIAST BP 80-89 MM HG: CPT | Mod: CPTII,S$GLB,, | Performed by: NURSE PRACTITIONER

## 2024-12-19 PROCEDURE — 3074F SYST BP LT 130 MM HG: CPT | Mod: CPTII,S$GLB,, | Performed by: NURSE PRACTITIONER

## 2024-12-19 PROCEDURE — 3044F HG A1C LEVEL LT 7.0%: CPT | Mod: CPTII,S$GLB,, | Performed by: NURSE PRACTITIONER

## 2024-12-19 PROCEDURE — 3008F BODY MASS INDEX DOCD: CPT | Mod: CPTII,S$GLB,, | Performed by: NURSE PRACTITIONER

## 2024-12-19 PROCEDURE — 1159F MED LIST DOCD IN RCRD: CPT | Mod: CPTII,S$GLB,, | Performed by: NURSE PRACTITIONER

## 2024-12-19 PROCEDURE — 99214 OFFICE O/P EST MOD 30 MIN: CPT | Mod: S$GLB,,, | Performed by: NURSE PRACTITIONER

## 2024-12-19 RX ORDER — LISDEXAMFETAMINE DIMESYLATE 40 MG/1
40 CAPSULE ORAL DAILY
Qty: 30 CAPSULE | Refills: 0 | Status: SHIPPED | OUTPATIENT
Start: 2025-02-19

## 2024-12-19 RX ORDER — LISDEXAMFETAMINE DIMESYLATE 40 MG/1
40 CAPSULE ORAL DAILY
Qty: 30 CAPSULE | Refills: 0 | Status: SHIPPED | OUTPATIENT
Start: 2024-12-19

## 2024-12-19 RX ORDER — LISDEXAMFETAMINE DIMESYLATE 40 MG/1
40 CAPSULE ORAL DAILY
Qty: 30 CAPSULE | Refills: 0 | Status: SHIPPED | OUTPATIENT
Start: 2025-01-19

## 2024-12-19 NOTE — PROGRESS NOTES
"Patient ID: Harmony Piedra is a 24 y.o. female.     History of Present Illness    CHIEF COMPLAINT:  Patient presents today for a follow-up ADHD visit.    ADHD:  She has a long history of ADHD and reports being stable on Vyvanse 40 mg daily without side effects. 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    VISION:  She requires glasses for distance, particularly while driving. She first noticed vision issues last year while driving between [place].          ROS:  General: -fever, -chills, -fatigue, -weight gain, -weight loss  Eyes: +vision changes, -redness, -discharge  ENT: -ear pain, -nasal congestion, -sore throat  Cardiovascular: -chest pain, -palpitations, -lower extremity edema  Respiratory: -cough, -shortness of breath  Gastrointestinal: -abdominal pain, -nausea, -vomiting, -diarrhea, -constipation, -blood in stool  Genitourinary: -dysuria, -hematuria, -frequency  Musculoskeletal: -joint pain, -muscle pain  Skin: -rash, -lesion  Neurological: -headache, -dizziness, -numbness, -tingling  Psychiatric: -anxiety, -depression, -sleep difficulty  Allergic: -allergic reactions         Physical Exam    Vitals:    12/19/24 1401   BP: 110/88   Pulse: 69   Resp: 18   Temp: 98.4 °F (36.9 °C)   TempSrc: Oral   SpO2: 96%   Weight: 85.5 kg (188 lb 7.9 oz)   Height: 5' 9" (1.753 m)   PainSc: 0-No pain     Body mass index is 27.84 kg/m².  Physical Exam    General: No acute distress. Well-developed. Well-nourished.  Eyes: EOMI. Sclerae anicteric.  HENT: Normocephalic. Atraumatic. Nares patent. Moist oral mucosa.  Ears: Bilateral TMs clear. Bilateral EACs clear.  Cardiovascular: Regular rate. Regular rhythm.  Respiratory: Normal respiratory effort. Clear to auscultation bilaterally.   Extremities: No lower extremity edema.  Neurological: Alert & oriented x3. No slurred speech. Normal gait.  Psychiatric: Normal mood. Normal affect. Good insight. Good judgment.  Skin: Warm. Dry. No rash.                "   Assessment & Plan    IMPRESSION:  - Patient stable on current ADHD medication regimen  - Patient declined flu vaccine offer    ATTENTION-DEFICIT HYPERACTIVITY DISORDER:  - Continued Vyvanse 40 mg daily.    FOLLOW-UP:  - Follow up in 3 months.  - Contact the office if appointment time needs to be adjusted due to class schedule.  - Virtual visits suitable for next appointment              This note was generated with the assistance of ambient listening technology. Verbal consent was obtained by the patient and accompanying visitor(s) for the recording of patient appointment to facilitate this note. I attest to having reviewed and edited the generated note for accuracy, though some syntax or spelling errors may persist. Please contact the author of this note for any clarification.

## 2025-03-19 ENCOUNTER — TELEPHONE (OUTPATIENT)
Dept: PHARMACY | Facility: CLINIC | Age: 25
End: 2025-03-19
Payer: COMMERCIAL

## 2025-03-19 NOTE — TELEPHONE ENCOUNTER
[facility]/Population Health Chart Review & Patient Outreach Details For Medication Adherence Project    Reason for Outreach Encounter: 3rd Party payor non-compliance report ([employer], BCBS, [employer], etc)  2.  Patient Outreach Method: Reviewed patient chart   3.   Medication in question:             Metformin discontinued.    4.  Reviewed and or Updates Made To: Patient Chart  5. Outreach Outcomes and/or actions taken: Medication discontinued  Additional Notes:

## 2025-03-21 ENCOUNTER — E-VISIT (OUTPATIENT)
Dept: FAMILY MEDICINE | Facility: CLINIC | Age: 25
End: 2025-03-21
Payer: COMMERCIAL

## 2025-03-21 DIAGNOSIS — N39.0 URINARY TRACT INFECTION WITHOUT HEMATURIA, SITE UNSPECIFIED: Primary | ICD-10-CM

## 2025-03-21 RX ORDER — SULFAMETHOXAZOLE AND TRIMETHOPRIM 800; 160 MG/1; MG/1
1 TABLET ORAL 2 TIMES DAILY
Qty: 6 TABLET | Refills: 0 | Status: SHIPPED | OUTPATIENT
Start: 2025-03-21 | End: 2025-03-24

## 2025-03-21 NOTE — PROGRESS NOTES
Patient ID: Harmony Piedra is a 24 y.o. female.    Chief Complaint: General Illness (Entered automatically based on patient selection in [employer].)    The patient initiated a request through [employer] on 3/21/2025 for evaluation and management with a chief complaint of General Illness (Entered automatically based on patient selection in [employer].)     I evaluated the questionnaire submission on 3/21/25.    [facility]    3/21/2025  8:36 AM CDT - Filed by Patient   What do you need help with? Other Concern   Do you agree to participate in an E-Visit? Yes   If you have any of the following symptoms, please go to the nearest emergency room or call 911: I acknowledge   Do you have any of the following pregnancy-related conditions? None   What is the main issue you would like addressed today? Have a UTI and need medication   Please describe your symptoms. Feel like I have to pee alot and uncomfortable   Where is your problem located? Vaginal   On a scale of 1-10, where 10 is the worst you can imagine, how severe are your symptoms? (range: 1 - 10) 3   Have you had these symptoms before? Yes   How long have you had these symptoms? A few days   What helps with your symptoms? Nothing really   What makes your symptoms feel worse? Just feels uncomfortable nothing really helps   Are your symptoms related to a condition you currently have? No   Please describe any probable cause for your symptoms. Dehydratation & Holding my pee on for exteneded periods od time   Provide any additional information you feel is important. Would need prescription sent to the [facility] on [address] due to traveling for work.   Please attach any relevant images or files    Are you able to take your vital signs? No         Encounter Diagnosis   Name Primary?    Urinary tract infection without hematuria, site unspecified Yes        Medications Ordered This Encounter   Medications     sulfamethoxazole-trimethoprim 800-160mg (BACTRIM DS) 800-160 mg Tab     Sig: Take 1 tablet by mouth 2 (two) times daily. for 3 days     Dispense:  6 tablet     Refill:  0        Follow up PRN      E-Visit Time Trackin mins

## 2025-04-04 ENCOUNTER — TELEPHONE (OUTPATIENT)
Dept: PHARMACY | Facility: CLINIC | Age: 25
End: 2025-04-04
Payer: COMMERCIAL

## 2025-04-05 NOTE — TELEPHONE ENCOUNTER
[facility]/Population Health Chart Review & Patient Outreach Details For Medication Adherence Project    Reason for Outreach Encounter: 3rd Party payor non-compliance report ([employer], BCBS, C, etc)  2.  Patient Outreach Method: Reviewed patient chart   3.   Medication in question:          Metformin d/c    4.  Reviewed and or Updates Made To: Patient Chart  5. Outreach Outcomes and/or actions taken: Medication discontinued  Additional Notes:

## 2025-05-02 ENCOUNTER — TELEPHONE (OUTPATIENT)
Dept: PHARMACY | Facility: CLINIC | Age: 25
End: 2025-05-02
Payer: COMMERCIAL

## 2025-06-18 ENCOUNTER — TELEPHONE (OUTPATIENT)
Dept: PHARMACY | Facility: CLINIC | Age: 25
End: 2025-06-18
Payer: COMMERCIAL